# Patient Record
Sex: FEMALE | Race: ASIAN | NOT HISPANIC OR LATINO | URBAN - METROPOLITAN AREA
[De-identification: names, ages, dates, MRNs, and addresses within clinical notes are randomized per-mention and may not be internally consistent; named-entity substitution may affect disease eponyms.]

---

## 2022-10-03 ENCOUNTER — EMERGENCY (EMERGENCY)
Facility: HOSPITAL | Age: 47
LOS: 1 days | Discharge: HOME | End: 2022-10-03
Attending: STUDENT IN AN ORGANIZED HEALTH CARE EDUCATION/TRAINING PROGRAM | Admitting: STUDENT IN AN ORGANIZED HEALTH CARE EDUCATION/TRAINING PROGRAM
Payer: MEDICAID

## 2022-10-03 VITALS
TEMPERATURE: 98 F | SYSTOLIC BLOOD PRESSURE: 94 MMHG | HEART RATE: 84 BPM | RESPIRATION RATE: 16 BRPM | OXYGEN SATURATION: 98 % | DIASTOLIC BLOOD PRESSURE: 66 MMHG | WEIGHT: 110.01 LBS

## 2022-10-03 LAB — HCG UR QL: NEGATIVE — SIGNIFICANT CHANGE UP

## 2022-10-03 PROCEDURE — 99283 EMERGENCY DEPT VISIT LOW MDM: CPT

## 2022-10-03 PROCEDURE — 81025 URINE PREGNANCY TEST: CPT

## 2022-10-03 NOTE — ED PROVIDER NOTE - CLINICAL SUMMARY MEDICAL DECISION MAKING FREE TEXT BOX
47yo F ho labial cysts,  pw pain and swelling to right labia for a couple days, no fevers, no diff urinating, on exam small nodule palpated in irght labia, nondraining, indurated not fluctuant, recs for abx, sitz bath, close gyn follow up

## 2022-10-03 NOTE — ED PROVIDER NOTE - CARE PROVIDER_API CALL
Pj Chua)  Obstetrics and Gynecology  [address] [place] [address]  Phone: [phone]  Fax: [phone]  Follow Up Time:

## 2022-10-03 NOTE — ED ADULT NURSE NOTE - CHIEF COMPLAINT
Rounded on pt. Pt resting in bed watching TV. Unlabored breathing. No signs of distress at this time. Call light within reach.    The patient is a 46y Female complaining of abscess.

## 2022-10-03 NOTE — ED PROVIDER NOTE - NS ED ATTENDING STATEMENT MOD
This was a shared visit with the SELENA. I reviewed and verified the documentation and independently performed the documented:

## 2022-10-03 NOTE — ED PROVIDER NOTE - NSFOLLOWUPINSTRUCTIONS_ED_ALL_ED_FT
Tylenol, ibuprofen for pain.  Sitz baths as discussed.  Bactrim twice a day (new prescription sent to pharmacy).  Call Dr. Chua's office today to be seen tomorrow.  Return for worsening or concerning symptoms.          A Bartholin's cyst is a fluid-filled sac that forms on a Bartholin's gland. Bartholin's glands are small glands in the folds of skin near the opening of the vagina (labia). This type of cyst causes a bulge or lump near the opening of the vagina.    If you have a cyst that is small and not infected, you may be able to take care of it at home. If your cyst gets infected, it may cause pain and your doctor may need to drain it.      What are the causes?    This condition may be caused by a blocked Bartholin's gland. Germs (bacteria) inside of the cyst can cause an infection.      What are the signs or symptoms?    •A bulge or lump near the opening of the vagina.      •Discomfort or pain.      •Redness, swelling, or fluid draining from the area.        How is this treated?    You may not need treatment if your cyst is not causing symptoms. The cyst can go away on its own with home care. Home care includes hot baths or heat therapy.    Large cysts or cysts that are infected may be treated with:  •Antibiotic medicine.      •A procedure to drain the fluid.      Cysts that keep coming back will need to be drained many times. Your doctor may talk to you about surgery to remove the cyst.      Follow these instructions at home:    Medicines     •Take over-the-counter and prescription medicines only as told by your doctor.      •If you were prescribed an antibiotic medicine, take it as told by your doctor. Do not stop taking it even if you start to feel better.      Managing pain and swelling     •Try sitz baths to help with pain and swelling. A sitz bath is a warm water bath in which the water only comes up to your hips and should cover your buttocks. You may take sitz baths a few times a day.    •If told, put heat on the affected area as often as needed. Use the heat source that your doctor recommends, such as a moist heat pack or a heating pad.  •Place a towel between your skin and the heat source.      •Leave the heat on for 20–30 minutes.      •Take off the heat if your skin turns bright red. This is very important. If you cannot feel pain, heat, or cold, you have a greater risk of getting burned.        General instructions   •If your cyst was drained:  •Follow instructions from your doctor about how to take care of your wound.       •Use feminine pads to absorb any fluid.        • Do not push on or squeeze your cyst.      • Do not have sex until the cyst has gone away or your wound from drainage has healed.    •Take these steps to help prevent a cyst from returning, and to prevent other cysts from forming:  •Take a bath or shower once a day. Clean the area around your vagina with mild soap and water when you bathe.      •Practice safe sex to prevent STIs. Talk with your doctor about how to prevent STIs and which forms of birth control to use.        •Keep all follow-up visits.        Contact a doctor if:    •You have a fever.      •You get more redness, swelling, or pain around your cyst.      •You have fluid, blood, pus, or a bad smell coming from your cyst.      •You have a cyst that gets larger or a cyst that comes back.        Summary    •A Bartholin's cyst is a fluid-filled sac that forms on a Bartholin's gland. These small glands are found in the folds of skin near the opening of the vagina (labia).      •This type of cyst causes a bulge or lump near the opening of the vagina.      •Try sitz baths a few times a day to help with pain and swelling.      • Do not push on or squeeze your cyst.      This information is not intended to replace advice given to you by your health care provider. Make sure you discuss any questions you have with your health care provider.

## 2022-10-03 NOTE — ED PROVIDER NOTE - PATIENT PORTAL LINK FT
You can access the FollowMyHealth Patient Portal offered by [facility] by registering at the following website: [email or web address]. By joining [employer]’s FollowMyHealth portal, you will also be able to view your health information using other applications (apps) compatible with our system.

## 2023-10-18 ENCOUNTER — EMERGENCY (EMERGENCY)
Facility: HOSPITAL | Age: 48
LOS: 1 days | Discharge: HOME | End: 2023-10-18
Attending: EMERGENCY MEDICINE | Admitting: EMERGENCY MEDICINE
Payer: MEDICAID

## 2023-10-18 VITALS
HEART RATE: 76 BPM | TEMPERATURE: 97 F | RESPIRATION RATE: 14 BRPM | OXYGEN SATURATION: 100 % | HEIGHT: 66 IN | SYSTOLIC BLOOD PRESSURE: 122 MMHG | WEIGHT: 115.08 LBS | DIASTOLIC BLOOD PRESSURE: 83 MMHG

## 2023-10-18 VITALS
OXYGEN SATURATION: 99 % | RESPIRATION RATE: 15 BRPM | DIASTOLIC BLOOD PRESSURE: 80 MMHG | SYSTOLIC BLOOD PRESSURE: 113 MMHG | HEART RATE: 72 BPM

## 2023-10-18 LAB
ALBUMIN SERPL ELPH-MCNC: 4.2 G/DL — SIGNIFICANT CHANGE UP (ref 3.3–5)
ALBUMIN SERPL ELPH-MCNC: 4.2 G/DL — SIGNIFICANT CHANGE UP (ref 3.3–5)
ALP SERPL-CCNC: 44 U/L — SIGNIFICANT CHANGE UP (ref 30–120)
ALP SERPL-CCNC: 44 U/L — SIGNIFICANT CHANGE UP (ref 30–120)
ALT FLD-CCNC: 17 U/L — SIGNIFICANT CHANGE UP (ref 10–60)
ALT FLD-CCNC: 17 U/L — SIGNIFICANT CHANGE UP (ref 10–60)
ANION GAP SERPL CALC-SCNC: 11 MMOL/L — SIGNIFICANT CHANGE UP (ref 5–17)
ANION GAP SERPL CALC-SCNC: 11 MMOL/L — SIGNIFICANT CHANGE UP (ref 5–17)
AST SERPL-CCNC: 23 U/L — SIGNIFICANT CHANGE UP (ref 10–40)
AST SERPL-CCNC: 23 U/L — SIGNIFICANT CHANGE UP (ref 10–40)
BASOPHILS # BLD AUTO: 0.02 K/UL — SIGNIFICANT CHANGE UP (ref 0–0.2)
BASOPHILS # BLD AUTO: 0.02 K/UL — SIGNIFICANT CHANGE UP (ref 0–0.2)
BASOPHILS NFR BLD AUTO: 0.3 % — SIGNIFICANT CHANGE UP (ref 0–2)
BASOPHILS NFR BLD AUTO: 0.3 % — SIGNIFICANT CHANGE UP (ref 0–2)
BILIRUB SERPL-MCNC: 0.5 MG/DL — SIGNIFICANT CHANGE UP (ref 0.2–1.2)
BILIRUB SERPL-MCNC: 0.5 MG/DL — SIGNIFICANT CHANGE UP (ref 0.2–1.2)
BUN SERPL-MCNC: 19 MG/DL — SIGNIFICANT CHANGE UP (ref 7–23)
BUN SERPL-MCNC: 19 MG/DL — SIGNIFICANT CHANGE UP (ref 7–23)
CALCIUM SERPL-MCNC: 9.5 MG/DL — SIGNIFICANT CHANGE UP (ref 8.4–10.5)
CALCIUM SERPL-MCNC: 9.5 MG/DL — SIGNIFICANT CHANGE UP (ref 8.4–10.5)
CHLORIDE SERPL-SCNC: 104 MMOL/L — SIGNIFICANT CHANGE UP (ref 96–108)
CHLORIDE SERPL-SCNC: 104 MMOL/L — SIGNIFICANT CHANGE UP (ref 96–108)
CO2 SERPL-SCNC: 26 MMOL/L — SIGNIFICANT CHANGE UP (ref 22–31)
CO2 SERPL-SCNC: 26 MMOL/L — SIGNIFICANT CHANGE UP (ref 22–31)
CREAT SERPL-MCNC: 0.91 MG/DL — SIGNIFICANT CHANGE UP (ref 0.5–1.3)
CREAT SERPL-MCNC: 0.91 MG/DL — SIGNIFICANT CHANGE UP (ref 0.5–1.3)
EGFR: 77 ML/MIN/1.73M2 — SIGNIFICANT CHANGE UP
EGFR: 77 ML/MIN/1.73M2 — SIGNIFICANT CHANGE UP
EOSINOPHIL # BLD AUTO: 0.02 K/UL — SIGNIFICANT CHANGE UP (ref 0–0.5)
EOSINOPHIL # BLD AUTO: 0.02 K/UL — SIGNIFICANT CHANGE UP (ref 0–0.5)
EOSINOPHIL NFR BLD AUTO: 0.3 % — SIGNIFICANT CHANGE UP (ref 0–6)
EOSINOPHIL NFR BLD AUTO: 0.3 % — SIGNIFICANT CHANGE UP (ref 0–6)
GLUCOSE SERPL-MCNC: 102 MG/DL — HIGH (ref 70–99)
GLUCOSE SERPL-MCNC: 102 MG/DL — HIGH (ref 70–99)
HCG UR QL: NEGATIVE — SIGNIFICANT CHANGE UP
HCG UR QL: NEGATIVE — SIGNIFICANT CHANGE UP
HCT VFR BLD CALC: 41.8 % — SIGNIFICANT CHANGE UP (ref 34.5–45)
HCT VFR BLD CALC: 41.8 % — SIGNIFICANT CHANGE UP (ref 34.5–45)
HGB BLD-MCNC: 13.1 G/DL — SIGNIFICANT CHANGE UP (ref 11.5–15.5)
HGB BLD-MCNC: 13.1 G/DL — SIGNIFICANT CHANGE UP (ref 11.5–15.5)
IMM GRANULOCYTES NFR BLD AUTO: 0.2 % — SIGNIFICANT CHANGE UP (ref 0–0.9)
IMM GRANULOCYTES NFR BLD AUTO: 0.2 % — SIGNIFICANT CHANGE UP (ref 0–0.9)
LIDOCAIN IGE QN: 30 U/L — SIGNIFICANT CHANGE UP (ref 16–77)
LIDOCAIN IGE QN: 30 U/L — SIGNIFICANT CHANGE UP (ref 16–77)
LYMPHOCYTES # BLD AUTO: 1.72 K/UL — SIGNIFICANT CHANGE UP (ref 1–3.3)
LYMPHOCYTES # BLD AUTO: 1.72 K/UL — SIGNIFICANT CHANGE UP (ref 1–3.3)
LYMPHOCYTES # BLD AUTO: 28.7 % — SIGNIFICANT CHANGE UP (ref 13–44)
LYMPHOCYTES # BLD AUTO: 28.7 % — SIGNIFICANT CHANGE UP (ref 13–44)
MCHC RBC-ENTMCNC: 29.2 PG — SIGNIFICANT CHANGE UP (ref 27–34)
MCHC RBC-ENTMCNC: 29.2 PG — SIGNIFICANT CHANGE UP (ref 27–34)
MCHC RBC-ENTMCNC: 31.3 GM/DL — LOW (ref 32–36)
MCHC RBC-ENTMCNC: 31.3 GM/DL — LOW (ref 32–36)
MCV RBC AUTO: 93.1 FL — SIGNIFICANT CHANGE UP (ref 80–100)
MCV RBC AUTO: 93.1 FL — SIGNIFICANT CHANGE UP (ref 80–100)
MONOCYTES # BLD AUTO: 0.32 K/UL — SIGNIFICANT CHANGE UP (ref 0–0.9)
MONOCYTES # BLD AUTO: 0.32 K/UL — SIGNIFICANT CHANGE UP (ref 0–0.9)
MONOCYTES NFR BLD AUTO: 5.3 % — SIGNIFICANT CHANGE UP (ref 2–14)
MONOCYTES NFR BLD AUTO: 5.3 % — SIGNIFICANT CHANGE UP (ref 2–14)
NEUTROPHILS # BLD AUTO: 3.91 K/UL — SIGNIFICANT CHANGE UP (ref 1.8–7.4)
NEUTROPHILS # BLD AUTO: 3.91 K/UL — SIGNIFICANT CHANGE UP (ref 1.8–7.4)
NEUTROPHILS NFR BLD AUTO: 65.2 % — SIGNIFICANT CHANGE UP (ref 43–77)
NEUTROPHILS NFR BLD AUTO: 65.2 % — SIGNIFICANT CHANGE UP (ref 43–77)
NRBC # BLD: 0 /100 WBCS — SIGNIFICANT CHANGE UP (ref 0–0)
NRBC # BLD: 0 /100 WBCS — SIGNIFICANT CHANGE UP (ref 0–0)
PLATELET # BLD AUTO: 247 K/UL — SIGNIFICANT CHANGE UP (ref 150–400)
PLATELET # BLD AUTO: 247 K/UL — SIGNIFICANT CHANGE UP (ref 150–400)
POTASSIUM SERPL-MCNC: 4 MMOL/L — SIGNIFICANT CHANGE UP (ref 3.5–5.3)
POTASSIUM SERPL-MCNC: 4 MMOL/L — SIGNIFICANT CHANGE UP (ref 3.5–5.3)
POTASSIUM SERPL-SCNC: 4 MMOL/L — SIGNIFICANT CHANGE UP (ref 3.5–5.3)
POTASSIUM SERPL-SCNC: 4 MMOL/L — SIGNIFICANT CHANGE UP (ref 3.5–5.3)
PROT SERPL-MCNC: 7.3 G/DL — SIGNIFICANT CHANGE UP (ref 6–8.3)
PROT SERPL-MCNC: 7.3 G/DL — SIGNIFICANT CHANGE UP (ref 6–8.3)
RBC # BLD: 4.49 M/UL — SIGNIFICANT CHANGE UP (ref 3.8–5.2)
RBC # BLD: 4.49 M/UL — SIGNIFICANT CHANGE UP (ref 3.8–5.2)
RBC # FLD: 13.2 % — SIGNIFICANT CHANGE UP (ref 10.3–14.5)
RBC # FLD: 13.2 % — SIGNIFICANT CHANGE UP (ref 10.3–14.5)
SODIUM SERPL-SCNC: 141 MMOL/L — SIGNIFICANT CHANGE UP (ref 135–145)
SODIUM SERPL-SCNC: 141 MMOL/L — SIGNIFICANT CHANGE UP (ref 135–145)
WBC # BLD: 6 K/UL — SIGNIFICANT CHANGE UP (ref 3.8–10.5)
WBC # BLD: 6 K/UL — SIGNIFICANT CHANGE UP (ref 3.8–10.5)
WBC # FLD AUTO: 6 K/UL — SIGNIFICANT CHANGE UP (ref 3.8–10.5)
WBC # FLD AUTO: 6 K/UL — SIGNIFICANT CHANGE UP (ref 3.8–10.5)

## 2023-10-18 PROCEDURE — 80053 COMPREHEN METABOLIC PANEL: CPT

## 2023-10-18 PROCEDURE — 81025 URINE PREGNANCY TEST: CPT

## 2023-10-18 PROCEDURE — 36415 COLL VENOUS BLD VENIPUNCTURE: CPT

## 2023-10-18 PROCEDURE — 99284 EMERGENCY DEPT VISIT MOD MDM: CPT

## 2023-10-18 PROCEDURE — 96375 TX/PRO/DX INJ NEW DRUG ADDON: CPT

## 2023-10-18 PROCEDURE — 83690 ASSAY OF LIPASE: CPT

## 2023-10-18 PROCEDURE — 96368 THER/DIAG CONCURRENT INF: CPT

## 2023-10-18 PROCEDURE — 85025 COMPLETE CBC W/AUTO DIFF WBC: CPT

## 2023-10-18 PROCEDURE — 96365 THER/PROPH/DIAG IV INF INIT: CPT

## 2023-10-18 PROCEDURE — 99284 EMERGENCY DEPT VISIT MOD MDM: CPT | Mod: 25

## 2023-10-18 RX ORDER — METOCLOPRAMIDE HCL 10 MG
10 TABLET ORAL ONCE
Refills: 0 | Status: COMPLETED | OUTPATIENT
Start: 2023-10-18 | End: 2023-10-18

## 2023-10-18 RX ORDER — SODIUM CHLORIDE 9 MG/ML
1000 INJECTION INTRAMUSCULAR; INTRAVENOUS; SUBCUTANEOUS ONCE
Refills: 0 | Status: COMPLETED | OUTPATIENT
Start: 2023-10-18 | End: 2023-10-18

## 2023-10-18 RX ORDER — DIPHENHYDRAMINE HCL 50 MG
50 CAPSULE ORAL ONCE
Refills: 0 | Status: COMPLETED | OUTPATIENT
Start: 2023-10-18 | End: 2023-10-18

## 2023-10-18 RX ORDER — ACETAMINOPHEN 500 MG
1000 TABLET ORAL ONCE
Refills: 0 | Status: COMPLETED | OUTPATIENT
Start: 2023-10-18 | End: 2023-10-18

## 2023-10-18 RX ADMIN — SODIUM CHLORIDE 1000 MILLILITER(S): 9 INJECTION INTRAMUSCULAR; INTRAVENOUS; SUBCUTANEOUS at 15:00

## 2023-10-18 RX ADMIN — Medication 104 MILLIGRAM(S): at 13:56

## 2023-10-18 RX ADMIN — Medication 400 MILLIGRAM(S): at 13:56

## 2023-10-18 RX ADMIN — Medication 10 MILLIGRAM(S): at 14:30

## 2023-10-18 RX ADMIN — Medication 1000 MILLIGRAM(S): at 14:30

## 2023-10-18 RX ADMIN — SODIUM CHLORIDE 1000 MILLILITER(S): 9 INJECTION INTRAMUSCULAR; INTRAVENOUS; SUBCUTANEOUS at 14:02

## 2023-10-18 RX ADMIN — Medication 50 MILLIGRAM(S): at 13:56

## 2023-10-18 NOTE — ED PROVIDER NOTE - IV ALTEPLASE EXCL REL HIDDEN
Called pt to follow up on axillary abscess. Left brief msg to return call. LPN to instruct patient to start flagyl (sent to pharmacy) if abscess persist and schedule follow up in 7-10 days. Otherwise, keep routine postpartum visit. show

## 2023-10-18 NOTE — ED PROVIDER NOTE - OBJECTIVE STATEMENT
Patient is a 48-year-old female presents to the emergency room with a chief complaint of a headache.  Also very anxious.  Past medical history of anxiety.  Reports that she was having difficulty sleeping despite her medication so she decided that she would try 4 drops over-the-counter CBD melatonin mixture.  Shortly after applying the mixture she developed a headache felt very anxious and jittery with nausea.  When asked about shortness of breath patient reports maybe she had mild shortness of breath.  Also notes epigastric discomfort.  She did difficult time sleeping and symptoms persisted throughout the day.  Denies any trauma to the head.  Denies any fevers chills difficulty seeing vomiting chest pain or abdominal pain.  Patient had never tried the over-the-counter CBD oil prior. Patient is a 48-year-old female presents to the emergency room with a chief complaint of a headache.  Also very anxious.  Past medical history of anxiety h/o migraines follows with neurology receives Botox had prior MRI.  Reports that she was having difficulty sleeping despite her medication so she decided that she would try 4 drops over-the-counter CBD melatonin mixture.  Shortly after applying the mixture she developed a headache felt very anxious and jittery with nausea.  When asked about shortness of breath patient reports maybe she had mild shortness of breath.  Also notes epigastric discomfort.  She did difficult time sleeping and symptoms persisted throughout the day.  Denies any trauma to the head.  Denies any fevers chills difficulty seeing vomiting chest pain or abdominal pain.  Patient had never tried the over-the-counter CBD oil prior.

## 2023-10-18 NOTE — ED PROVIDER NOTE - PATIENT PORTAL LINK FT
You can access the FollowMyHealth Patient Portal offered by [facility] by registering at the following website: [email or web address]. By joining [employer]’s FollowMyHealth portal, you will also be able to view your health information using other applications (apps) compatible with our system.

## 2023-10-18 NOTE — ED PROVIDER NOTE - NSFOLLOWUPINSTRUCTIONS_ED_ALL_ED_FT
Return to the ED for any new or worsening symptoms  Take your medication as prescribed  Advance activity as tolerated  Make sure to remain hydrated  Stop the over-the-counter CBD oil  Follow-up with your PMD in 1 to 2 days for recheck      Acute Headache    WHAT YOU NEED TO KNOW:    What is an acute headache? An acute headache is pain or discomfort that may start suddenly and get worse quickly. You may have an acute headache only when you feel stress or eat certain foods. Other acute headache pain can happen every day, and sometimes several times a day.    What are the most common types of acute headache?    Tension headache is the most common type of headache. These headaches typically occur in the late afternoon and go away by evening. The pain is usually mild or moderate. You may have problems tolerating bright light or loud noise. The pain is usually across the forehead or in the back of the head, often only on one side. These headaches may occur every day.    Migraine headaches cause moderate or severe pain. The headache generally lasts from 1 to 3 days and tends to come back. Pain is usually on only one side, but it may change sides. Migraines often occur in the temple, the back of the head, or behind the eye. The pain may throb or be sharp and steady.    A migraine with aura means you see or feel something before a migraine. You may see a small spot surrounded by bright zigzag lines. Other signs or symptoms may follow the aura.    Cluster headache pain is usually only on one side. It often causes severe pain, and can last for 30 minutes to 2 hours. These headaches may occur 1 or 2 times each day, more often at night. The pain may wake you.  What causes acute headaches? The cause of your headache may not be known. The following can trigger a headache:    Stress or tension, hours or even days after stressful events    Fatigue, a lack of sleep or changes in your usual sleep pattern, or a nap during the day    Menstruation, especially after pregnancy, or use of birth control pills or hormone replacement therapy    Food such as cured meats, artificial sweeteners, alcohol, dark chocolate, and MSG    Suddenly not having caffeine if you usually have larger amounts    A medical problem, such as an infection, tooth pain, neck or sinus pain, thyroid problems, or a tumor    A head injury  How is the type of acute headache diagnosed and treated? Your healthcare provider will ask you to describe your pain and rate it on a scale from 1 to 10. Tell the provider how often you have headaches and how long they last. Also describe any other symptoms you have along with headaches, such as dizziness or blurred vision. You may need tests including a CT scan to make sure there is not a leak in any blood vessels.    Medicines may be given to manage or prevent headaches. The medicine will depend on the type of acute headache you have. Do not wait until the pain is severe before you take your medicine. You may be able to take over-the-counter pain medicines as needed. Examples include NSAIDs and acetaminophen. Ask your healthcare provider which medicine is right for you. Ask how much to take and when to take it. Follow directions. These medicines can cause stomach bleeding or kidney or liver damage if not taken correctly.    Biofeedback may be used to help you manage stress. Electrodes (wires) are placed on your body and attached to a monitor. You will learn how to change stress reactions. For example, you learn to slow your heart rate when you become upset.    Cognitive behavior therapy, or stress management, may be used with other therapies to prevent headaches.  What can I do to manage my symptoms?    Apply heat or ice on the headache area. Use a heat or ice pack. For an ice pack, you can also put crushed ice in a plastic bag. Cover the pack or bag with a towel before you apply it to your skin. Ice and heat both help decrease pain, and heat also helps decrease muscle spasms. Apply heat for 20 to 30 minutes every 2 hours. Apply ice for 15 to 20 minutes every hour. Apply heat or ice for as long and for as many days as directed. You may alternate heat and ice.    Relax your muscles. Lie down in a comfortable position and close your eyes. Relax your muscles slowly. Start at your toes and work your way up your body.    Keep a record of your headaches. Write down when your headaches start and stop. Include your symptoms and what you were doing when the headache began. Record what you ate or drank for 24 hours before the headache started. Describe the pain and where it hurts. Keep track of what you did to treat your headache and if it worked.  What can I do to prevent an acute headache?    Avoid anything that triggers an acute headache. Examples include exposure to chemicals, going to high altitude, or not getting enough sleep. Create a regular sleep routine. Go to sleep at the same time and wake up at the same time each day. Do not use electronic devices before bedtime. These may trigger a headache or prevent you from sleeping well.    Do not smoke. Nicotine and other chemicals in cigarettes and cigars can trigger an acute headache or make it worse. Ask your healthcare provider for information if you currently smoke and need help to quit. E-cigarettes or smokeless tobacco still contain nicotine. Talk to your healthcare provider before you use these products.    Limit alcohol as directed. Alcohol can trigger an acute headache or make it worse. If you have cluster headaches, do not drink alcohol during an episode. For other types of headaches, ask your healthcare provider if it is safe for you to drink alcohol. Ask how much is safe for you to drink, and how often.    Exercise as directed. Exercise can reduce tension and help with headache pain. Aim for 30 minutes of physical activity on most days of the week. Your healthcare provider can help you create an exercise plan.    Eat a variety of healthy foods. Healthy foods include fruits, vegetables, low-fat dairy products, lean meats, fish, whole grains, and cooked beans. Your healthcare provider or dietitian can help you create meals plans if you need to avoid foods that trigger headaches.  When should I seek immediate care?    You have severe pain.    You have numbness or weakness on one side of your face or body.    You have a headache that occurs after a blow to the head, a fall, or other trauma.    You have a headache, are forgetful or confused, or have trouble speaking.    You have a headache, stiff neck, and a fever.  When should I call my doctor?    You have a constant headache and are vomiting.    You have a headache each day that does not get better, even after treatment.    You have changes in your headaches, or new symptoms that occur when you have a headache.    You have questions or concerns about your condition or care.  CARE AGREEMENT:    You have the right to help plan your care. Learn about your health condition and how it may be treated. Discuss treatment options with your healthcare providers to decide what care you want to receive. You always have the right to refuse treatment.

## 2023-10-18 NOTE — ED ADULT TRIAGE NOTE - BEFAST ARM SIDE DRIFT
No No caffine, no carbonated beverages, no chocolate/consistent carbohydrate (evening snack)/1000ml/regular/renal replacement pts:no protein restr,no conc K & phos, low sodium

## 2023-10-18 NOTE — ED ADULT NURSE NOTE - NSFALLUNIVINTERV_ED_ALL_ED
Bed/Stretcher in lowest position, wheels locked, appropriate side rails in place/Call bell, personal items and telephone in reach/Instruct patient to call for assistance before getting out of bed/chair/stretcher/Non-slip footwear applied when patient is off stretcher/[place] to call system/Physically safe environment - no spills, clutter or unnecessary equipment/Purposeful proactive rounding/Room/bathroom lighting operational, light cord in reach

## 2023-10-18 NOTE — ED PROVIDER NOTE - CLINICAL SUMMARY MEDICAL DECISION MAKING FREE TEXT BOX
Patient is a 48-year-old female presents to the emergency room with a chief complaint of a headache.  Also very anxious.  Past medical history of anxiety.  Reports that she was having difficulty sleeping despite her medication so she decided that she would try 4 drops over-the-counter CBD melatonin mixture.  Shortly after applying the mixture she developed a headache felt very anxious and jittery with nausea.  When asked about shortness of breath patient reports maybe she had mild shortness of breath.  Also notes epigastric discomfort.  She did difficult time sleeping and symptoms persisted throughout the day.  Denies any trauma to the head.  Denies any fevers chills difficulty seeing vomiting chest pain or abdominal pain.  Patient had never tried the over-the-counter CBD oil prior. Patient is a 48-year-old female presents to the emergency room with a chief complaint of a headache.  Also very anxious.  Past medical history of anxiety.  Reports that she was having difficulty sleeping despite her medication so she decided that she would try 4 drops over-the-counter CBD melatonin mixture.  Shortly after applying the mixture she developed a headache felt very anxious and jittery with nausea.  When asked about shortness of breath patient reports maybe she had mild shortness of breath.  Also notes epigastric discomfort.  She did difficult time sleeping and symptoms persisted throughout the day.  Denies any trauma to the head.  Denies any fevers chills difficulty seeing vomiting chest pain or abdominal pain.  Patient had never tried the over-the-counter CBD oil prior.  Patient presenting to the emergency room with headache and anxiety after taking over-the-counter CBD oil.  Differential is broad includes possible dehydration versus anxiety versus medication reaction.  Will obtain screening labs check pregnancy hydrate medicate for symptoms and monitor. Patient resting comfortably with resolution of headache stable for discharge home.  Patient with a normal white count no evidence of anemia no electrolyte abnormality urine pregnancy negative. Patient is a 48-year-old female presents to the emergency room with a chief complaint of a headache.  Also very anxious.  Past medical history of anxiety h/o migraines follows with neurology receives Botox had prior MRI.  Reports that she was having difficulty sleeping despite her medication so she decided that she would try 4 drops over-the-counter CBD melatonin mixture.  Shortly after applying the mixture she developed a headache felt very anxious and jittery with nausea.  When asked about shortness of breath patient reports maybe she had mild shortness of breath.  Also notes epigastric discomfort.  She did difficult time sleeping and symptoms persisted throughout the day.  Denies any trauma to the head.  Denies any fevers chills difficulty seeing vomiting chest pain or abdominal pain.  Patient had never tried the over-the-counter CBD oil prior.  Patient presenting to the emergency room with headache and anxiety after taking over-the-counter CBD oil.  Differential is broad includes possible dehydration versus anxiety versus medication reaction.  Will obtain screening labs check pregnancy hydrate medicate for symptoms and monitor. Patient resting comfortably with resolution of headache stable for discharge home.  Patient with a normal white count no evidence of anemia no electrolyte abnormality urine pregnancy negative.

## 2023-10-18 NOTE — ED PROVIDER NOTE - DIFFERENTIAL DIAGNOSIS
Patient presenting to the emergency room with headache and anxiety after taking over-the-counter CBD oil.  Differential is broad includes possible dehydration versus anxiety versus medication reaction.  Will obtain screening labs check pregnancy hydrate medicate for symptoms and monitor. Differential Diagnosis

## 2024-03-10 ENCOUNTER — EMERGENCY (EMERGENCY)
Facility: HOSPITAL | Age: 49
LOS: 1 days | Discharge: HOME | End: 2024-03-10
Attending: EMERGENCY MEDICINE | Admitting: EMERGENCY MEDICINE
Payer: MEDICAID

## 2024-03-10 VITALS
SYSTOLIC BLOOD PRESSURE: 101 MMHG | DIASTOLIC BLOOD PRESSURE: 66 MMHG | OXYGEN SATURATION: 97 % | RESPIRATION RATE: 15 BRPM | HEART RATE: 80 BPM

## 2024-03-10 VITALS
OXYGEN SATURATION: 97 % | TEMPERATURE: 98 F | HEART RATE: 81 BPM | RESPIRATION RATE: 15 BRPM | SYSTOLIC BLOOD PRESSURE: 100 MMHG | DIASTOLIC BLOOD PRESSURE: 64 MMHG | WEIGHT: 110.23 LBS | HEIGHT: 66 IN

## 2024-03-10 DIAGNOSIS — N75.0 CYST OF BARTHOLIN'S GLAND: ICD-10-CM

## 2024-03-10 LAB
GRAM STN FLD: ABNORMAL
SPECIMEN SOURCE: SIGNIFICANT CHANGE UP

## 2024-03-10 PROCEDURE — 99284 EMERGENCY DEPT VISIT MOD MDM: CPT

## 2024-03-10 PROCEDURE — 87077 CULTURE AEROBIC IDENTIFY: CPT

## 2024-03-10 PROCEDURE — 87186 SC STD MICRODIL/AGAR DIL: CPT

## 2024-03-10 PROCEDURE — 87070 CULTURE OTHR SPECIMN AEROBIC: CPT

## 2024-03-10 PROCEDURE — 87205 SMEAR GRAM STAIN: CPT

## 2024-03-10 PROCEDURE — 56420 I&D BARTHOLINS GLAND ABSCESS: CPT

## 2024-03-10 NOTE — ED ADULT NURSE NOTE - OBJECTIVE STATEMENT
patient is from home, aaox3, c/o pain in her vaginal area from cyst, I&D done about week ago and took antibiotic, still has pain and now has a cyst different place, denies fever or chills, no n/v or urinary symptoms, pending GYN consult, plan of care ongoing

## 2024-03-10 NOTE — ED PROVIDER NOTE - NSFOLLOWUPINSTRUCTIONS_ED_ALL_ED_FT
Bartholin's Cyst Incision and Drainage, Care After  After Bartholin's cyst incision and drainage, it is common to have light vaginal discharge. It may contain blood. It is also common to have:  Mild pain.  Discomfort.  Follow these instructions at home:  The instructions below may help you care for yourself at home. Your health care provider may give you more instructions. If you have questions, ask your health care provider.    Medicines    Take over-the-counter and prescription medicines only as told by your health care provider.  If you were prescribed antibiotics, take them as told by your health care provider. Do not stop taking them even if you start to feel better.  Bathing    A bathtub partially filled with water.  Take sitz baths as told by your health care provider. You may be told to start these 1–2 days after your procedure. Take them once or twice each day.  Incision care    Follow instructions from your health care provider about:  Signs of infection.  How to take care of your incision. Make sure you:  Wash your hands with soap and water for at least 20 seconds before and after you change your bandage. If you cannot use soap and water, use hand .  Change your bandage.  Leave stitches or skin glue in place for at least 2 weeks.  Leave tape strips alone unless you are told to take them off. You may trim the edges of the tape strips if they curl up.  General instructions    Return to your normal activities when your health care provider says that it is safe.  Do not have vaginal sex or insert anything in your vagina until your health care provider says it is safe.  Wear an absorbent pad if you have any discharge.  Keep all follow-up visits. This is important. If you have a catheter, return to your health care provider to have it removed.  Contact a health care provider if:  You have chills or a fever.  You have pain even after taking medicine.  Your incision has signs of infection.  Your catheter comes out.  Summary  After your procedure, it is common to have mild pain and light discharge.  Start taking sitz baths as told by your health care provider.  Do not have vaginal sex or insert anything in your vagina until your health care provider says it is safe.  Contact your health care provider if your incision has signs of infection.  If you have a catheter, return to your health care provider to have it removed. Follow-up with your GYN on Wednesday, 3/13 for wound check, reevaluation, ongoing care and treatment.  Keep wound clean/dry/intact.  Take full course of antibiotics as prescribed.  Take over-the-counter Motrin with food as directed for pain.  If having worsening symptoms, fever, streaking or other related symptoms, return to the ER immediately.    Bartholin's Cyst Incision and Drainage, Care After  After Bartholin's cyst incision and drainage, it is common to have light vaginal discharge. It may contain blood. It is also common to have:  Mild pain.  Discomfort.  Follow these instructions at home:  The instructions below may help you care for yourself at home. Your health care provider may give you more instructions. If you have questions, ask your health care provider.    Medicines    Take over-the-counter and prescription medicines only as told by your health care provider.  If you were prescribed antibiotics, take them as told by your health care provider. Do not stop taking them even if you start to feel better.  Bathing    A bathtub partially filled with water.  Take sitz baths as told by your health care provider. You may be told to start these 1–2 days after your procedure. Take them once or twice each day.  Incision care    Follow instructions from your health care provider about:  Signs of infection.  How to take care of your incision. Make sure you:  Wash your hands with soap and water for at least 20 seconds before and after you change your bandage. If you cannot use soap and water, use hand .  Change your bandage.  Leave stitches or skin glue in place for at least 2 weeks.  Leave tape strips alone unless you are told to take them off. You may trim the edges of the tape strips if they curl up.  General instructions    Return to your normal activities when your health care provider says that it is safe.  Do not have vaginal sex or insert anything in your vagina until your health care provider says it is safe.  Wear an absorbent pad if you have any discharge.  Keep all follow-up visits. This is important. If you have a catheter, return to your health care provider to have it removed.  Contact a health care provider if:  You have chills or a fever.  You have pain even after taking medicine.  Your incision has signs of infection.  Your catheter comes out.  Summary  After your procedure, it is common to have mild pain and light discharge.  Start taking sitz baths as told by your health care provider.  Do not have vaginal sex or insert anything in your vagina until your health care provider says it is safe.  Contact your health care provider if your incision has signs of infection.  If you have a catheter, return to your health care provider to have it removed.

## 2024-03-10 NOTE — CONSULT NOTE ADULT - SUBJECTIVE AND OBJECTIVE BOX
Patient is 47yo presenting for R bartholins cyst that appears 2 weeks ago. Reports that it was initially small however went to GYN last week when it was enlarged and cyst was needle aspirated. She was placed on amoxicillin for 3 days, however cyst has been growing rapidly since then. Has f/u on Wednesday but pain was so intense she came to ED today. Denies any fevers/chills, drainage. No other symptoms.  Patient has had recurrence of this cyst 3 times, always on right vulva.     PMH: insomnia, depression/anxiety, h/o breast cancer (in remission)    PSH: CSx1    Meds: zolpidem, lexapro, xanax    All: NKDA    SocHx: denies    FamHx: denies      O:  Vital Signs Last 24 Hrs  T(C): 36.8 (10 Mar 2024 14:06), Max: 36.8 (10 Mar 2024 14:06)  T(F): 98.3 (10 Mar 2024 14:06), Max: 98.3 (10 Mar 2024 14:06)  HR: 81 (10 Mar 2024 14:06) (81 - 81)  BP: 100/64 (10 Mar 2024 14:06) (100/64 - 100/64)  BP(mean): --  RR: 15 (10 Mar 2024 14:06) (15 - 15)  SpO2: 97% (10 Mar 2024 14:06) (97% - 97%)    Parameters below as of 10 Mar 2024 14:06  Patient On (Oxygen Delivery Method): room air    Gen: no acute  distress  CV/Resp: nonlabored  Abd: soft, nontender  : 4x3cm right bartholin's cyst, indurated and tender to touch, no drainage noted; no significant erythema or cellulitis noted  Psych: appropriate mood and affect  Ext: nontender, nondistended

## 2024-03-10 NOTE — ED PROVIDER NOTE - OBJECTIVE STATEMENT
48-year-old female with history of depression, anxiety presents with complaint of cyst to her right labia consistent with her prior Bartholin cyst.  Patient states that she noticed swelling to her right labia 2 weeks ago, saw her GYN in [place], Dr. Molina 1 week ago and had needle aspiration of the cyst, was prescribed amoxicillin for 3 days and advised to return on 3/20 if symptoms return.  States that swelling started getting worse last night with worsening pain and hence came to ER for evaluation.  Denies fever, chills, nausea, vomiting, drainage or other symptoms. 48-year-old female with history of depression, anxiety presents with complaint of cyst to her right labia consistent with her prior Bartholin cyst.  Patient states that she noticed swelling to her right labia 2 weeks ago, saw her GYN in [place], Dr. Molina 1 week ago and had needle aspiration of the cyst, was prescribed amoxicillin for 3 days and advised to return on 3/20 if symptoms return.  States that swelling started getting worse last night with worsening pain and difficulty ambulating and hence came to ER for evaluation. Patient relates her Bartholin cyst in the past have required multiple aspirations to resolve. Denies fever, chills, nausea, vomiting, drainage or other symptoms.

## 2024-03-10 NOTE — ED PROVIDER NOTE - ATTENDING APP SHARED VISIT CONTRIBUTION OF CARE
entire history and physical with female JU Peña present  Patient complaining of cyst to right labia consistent with prior Bartholin cyst.  Patient relates has had pain and swelling to her right labia for the past 2 weeks saw her gynecologist last week had a needle aspiration was given amoxicillin and scheduled for follow-up in 3 days however cyst has grown significantly and is causing pain and difficulty ambulating so came to ER.  Patient denies fevers chills drainage or any other complaints.  Patient relates her Bartholin cyst in the past have required multiple aspirations to resolve  GYN in [place]    Plan GYN consult for drainage    Differential including but not limited to Bartholin cyst abscess

## 2024-03-10 NOTE — ED PROVIDER NOTE - PROGRESS NOTE DETAILS
Spoke to GYN attending, Dr. Bob who will consult pt in ED shortly for I&D. Bartholin cyst drained in ED at bedside by Dr. Bob, cleared for dc home on bactrim x 5 days, has f/u with her GYN in 3 days.  Reevaluated patient at bedside.  Patient feeling much improved. Bartholin cyst drained in ED at bedside by GYN, Dr. Bob, cleared for dc home on bactrim x 5 days, has f/u with her GYN in 3 days.  Reevaluated patient at bedside.  Patient feeling much improved.

## 2024-03-10 NOTE — ED PROVIDER NOTE - PROVIDER TOKENS
PROVIDER:[TOKEN:[[id number]],FOLLOWUP:[1-3 Days]],FREE:[LAST:[YOUR GYN],PHONE:[(   )    -],FAX:[(   )    -],FOLLOWUP:[1-3 Days]]

## 2024-03-10 NOTE — CONSULT NOTE ADULT - ASSESSMENT
49yo presenting for painful R bartholin's cyst.    P  consent signed for R bartholins cyst incision and drainage and possible marsupialization  afebrile, vitals wnl    Procedure:  Patient placed in dorsal lithotomy position. Area of Hollywood line of inner R vulva cleaned with betadine. 3mL 1% lidocaine administered to area. 1cm incision made at Hollywood line and cyst opened with hemostats. Bartholin's cyst fluid drained in entirety, using hemostats to open any further pockets. Induration of R vulva was no longer noted. Culture swab obtained. Two interrupted stitches placed on L and R sides of incision with cyst wall as a partial marsupialization. Patient tolerated procedure well, pain resolved.    Bactrim DS x5 days sent to pharmacy  has f/u appt Wed with GYN, my information also given  counseled about marsupialization in OR vs bartholin's gland removal with Urogyn (referral given) if recurs  patient feeling much better after drainage, stable for discharge    Thank you for this consult please contact GYN on call for any further questions.
04-May-2018 15:33

## 2024-03-10 NOTE — ED PROVIDER NOTE - PATIENT PORTAL LINK FT
You can access the FollowMyHealth Patient Portal offered by [facility] by registering at the following website: [email or web address]. By joining [employer]’s FollowMyHealth portal, you will also be able to view your health information using other applications (apps) compatible with our system.

## 2024-03-10 NOTE — ED PROVIDER NOTE - CARE PROVIDER_API CALL
Urvashi Bob  Obstetrics and Gynecology  [address] [place] [id number]  Phone: [phone]  Fax: [phone]  Follow Up Time: 1-3 Days    YOUR GYN,   Phone: (   )    -  Fax: (   )    -  Follow Up Time: 1-3 Days

## 2024-03-10 NOTE — ED PROVIDER NOTE - CARE PROVIDERS DIRECT ADDRESSES
[email or web address]

## 2024-03-10 NOTE — ED PROVIDER NOTE - CLINICAL SUMMARY MEDICAL DECISION MAKING FREE TEXT BOX
entire history and physical with female JU Peña present  Patient complaining of cyst to right labia consistent with prior Bartholin cyst.  Patient relates has had pain and swelling to her right labia for the past 2 weeks saw her gynecologist last week had a needle aspiration was given amoxicillin and scheduled for follow-up in 3 days however cyst has grown significantly and is causing pain and difficulty ambulating so came to ER.  Patient denies fevers chills drainage or any other complaints.  Patient relates her Bartholin cyst in the past have required multiple aspirations to resolve.  GYN in [place]    Plan GYN consult for drainage    Differential including but not limited to Bartholin cyst abscess

## 2024-03-11 LAB — GRAM STN FLD: ABNORMAL

## 2024-03-12 LAB
-  AMOXICILLIN/CLAVULANIC ACID: SIGNIFICANT CHANGE UP
-  AMPICILLIN/SULBACTAM: SIGNIFICANT CHANGE UP
-  AMPICILLIN: SIGNIFICANT CHANGE UP
-  AZTREONAM: SIGNIFICANT CHANGE UP
-  CEFAZOLIN: SIGNIFICANT CHANGE UP
-  CEFEPIME: SIGNIFICANT CHANGE UP
-  CEFOXITIN: SIGNIFICANT CHANGE UP
-  CEFTRIAXONE: SIGNIFICANT CHANGE UP
-  CIPROFLOXACIN: SIGNIFICANT CHANGE UP
-  ERTAPENEM: SIGNIFICANT CHANGE UP
-  GENTAMICIN: SIGNIFICANT CHANGE UP
-  IMIPENEM: SIGNIFICANT CHANGE UP
-  LEVOFLOXACIN: SIGNIFICANT CHANGE UP
-  MEROPENEM: SIGNIFICANT CHANGE UP
-  PIPERACILLIN/TAZOBACTAM: SIGNIFICANT CHANGE UP
-  TOBRAMYCIN: SIGNIFICANT CHANGE UP
-  TRIMETHOPRIM/SULFAMETHOXAZOLE: SIGNIFICANT CHANGE UP
METHOD TYPE: SIGNIFICANT CHANGE UP

## 2024-03-15 LAB
CULTURE RESULTS: ABNORMAL
ORGANISM # SPEC MICROSCOPIC CNT: ABNORMAL
ORGANISM # SPEC MICROSCOPIC CNT: ABNORMAL
SPECIMEN SOURCE: SIGNIFICANT CHANGE UP

## 2024-03-20 ENCOUNTER — EMERGENCY (EMERGENCY)
Facility: HOSPITAL | Age: 49
LOS: 1 days | Discharge: HOME | End: 2024-03-20
Attending: EMERGENCY MEDICINE | Admitting: EMERGENCY MEDICINE
Payer: MEDICAID

## 2024-03-20 VITALS
SYSTOLIC BLOOD PRESSURE: 114 MMHG | DIASTOLIC BLOOD PRESSURE: 72 MMHG | RESPIRATION RATE: 19 BRPM | OXYGEN SATURATION: 100 % | TEMPERATURE: 98 F | HEART RATE: 66 BPM

## 2024-03-20 VITALS
WEIGHT: 115.08 LBS | HEIGHT: 66 IN | RESPIRATION RATE: 18 BRPM | TEMPERATURE: 98 F | DIASTOLIC BLOOD PRESSURE: 70 MMHG | SYSTOLIC BLOOD PRESSURE: 110 MMHG | HEART RATE: 71 BPM

## 2024-03-20 LAB
ALBUMIN SERPL ELPH-MCNC: 3.5 G/DL — SIGNIFICANT CHANGE UP (ref 3.3–5)
ALP SERPL-CCNC: 73 U/L — SIGNIFICANT CHANGE UP (ref 30–120)
ALT FLD-CCNC: 22 U/L — SIGNIFICANT CHANGE UP (ref 10–60)
ANION GAP SERPL CALC-SCNC: 7 MMOL/L — SIGNIFICANT CHANGE UP (ref 5–17)
APTT BLD: 28.8 SEC — SIGNIFICANT CHANGE UP (ref 24.5–35.6)
AST SERPL-CCNC: 17 U/L — SIGNIFICANT CHANGE UP (ref 10–40)
BASOPHILS # BLD AUTO: 0.04 K/UL — SIGNIFICANT CHANGE UP (ref 0–0.2)
BASOPHILS NFR BLD AUTO: 0.5 % — SIGNIFICANT CHANGE UP (ref 0–2)
BILIRUB SERPL-MCNC: 0.2 MG/DL — SIGNIFICANT CHANGE UP (ref 0.2–1.2)
BUN SERPL-MCNC: 18 MG/DL — SIGNIFICANT CHANGE UP (ref 7–23)
CALCIUM SERPL-MCNC: 8.3 MG/DL — LOW (ref 8.4–10.5)
CHLORIDE SERPL-SCNC: 106 MMOL/L — SIGNIFICANT CHANGE UP (ref 96–108)
CO2 SERPL-SCNC: 30 MMOL/L — SIGNIFICANT CHANGE UP (ref 22–31)
CREAT SERPL-MCNC: 0.71 MG/DL — SIGNIFICANT CHANGE UP (ref 0.5–1.3)
EGFR: 104 ML/MIN/1.73M2 — SIGNIFICANT CHANGE UP
EOSINOPHIL # BLD AUTO: 0.07 K/UL — SIGNIFICANT CHANGE UP (ref 0–0.5)
EOSINOPHIL NFR BLD AUTO: 0.9 % — SIGNIFICANT CHANGE UP (ref 0–6)
GLUCOSE SERPL-MCNC: 131 MG/DL — HIGH (ref 70–99)
HCT VFR BLD CALC: 32.3 % — LOW (ref 34.5–45)
HGB BLD-MCNC: 9.3 G/DL — LOW (ref 11.5–15.5)
IMM GRANULOCYTES NFR BLD AUTO: 0.5 % — SIGNIFICANT CHANGE UP (ref 0–0.9)
INR BLD: 0.98 RATIO — SIGNIFICANT CHANGE UP (ref 0.85–1.18)
LYMPHOCYTES # BLD AUTO: 2.19 K/UL — SIGNIFICANT CHANGE UP (ref 1–3.3)
LYMPHOCYTES # BLD AUTO: 27.4 % — SIGNIFICANT CHANGE UP (ref 13–44)
MCHC RBC-ENTMCNC: 24.2 PG — LOW (ref 27–34)
MCHC RBC-ENTMCNC: 28.8 GM/DL — LOW (ref 32–36)
MCV RBC AUTO: 84.1 FL — SIGNIFICANT CHANGE UP (ref 80–100)
MONOCYTES # BLD AUTO: 0.54 K/UL — SIGNIFICANT CHANGE UP (ref 0–0.9)
MONOCYTES NFR BLD AUTO: 6.8 % — SIGNIFICANT CHANGE UP (ref 2–14)
NEUTROPHILS # BLD AUTO: 5.12 K/UL — SIGNIFICANT CHANGE UP (ref 1.8–7.4)
NEUTROPHILS NFR BLD AUTO: 63.9 % — SIGNIFICANT CHANGE UP (ref 43–77)
NRBC # BLD: 0 /100 WBCS — SIGNIFICANT CHANGE UP (ref 0–0)
PLATELET # BLD AUTO: 321 K/UL — SIGNIFICANT CHANGE UP (ref 150–400)
POTASSIUM SERPL-MCNC: 3.9 MMOL/L — SIGNIFICANT CHANGE UP (ref 3.5–5.3)
POTASSIUM SERPL-SCNC: 3.9 MMOL/L — SIGNIFICANT CHANGE UP (ref 3.5–5.3)
PROT SERPL-MCNC: 6.6 G/DL — SIGNIFICANT CHANGE UP (ref 6–8.3)
PROTHROM AB SERPL-ACNC: 11 SEC — SIGNIFICANT CHANGE UP (ref 9.5–13)
RBC # BLD: 3.84 M/UL — SIGNIFICANT CHANGE UP (ref 3.8–5.2)
RBC # FLD: 15.3 % — HIGH (ref 10.3–14.5)
SODIUM SERPL-SCNC: 143 MMOL/L — SIGNIFICANT CHANGE UP (ref 135–145)
WBC # BLD: 8 K/UL — SIGNIFICANT CHANGE UP (ref 3.8–10.5)
WBC # FLD AUTO: 8 K/UL — SIGNIFICANT CHANGE UP (ref 3.8–10.5)

## 2024-03-20 PROCEDURE — 85025 COMPLETE CBC W/AUTO DIFF WBC: CPT

## 2024-03-20 PROCEDURE — 85610 PROTHROMBIN TIME: CPT

## 2024-03-20 PROCEDURE — 85730 THROMBOPLASTIN TIME PARTIAL: CPT

## 2024-03-20 PROCEDURE — 99284 EMERGENCY DEPT VISIT MOD MDM: CPT

## 2024-03-20 PROCEDURE — 86901 BLOOD TYPING SEROLOGIC RH(D): CPT

## 2024-03-20 PROCEDURE — 86900 BLOOD TYPING SEROLOGIC ABO: CPT

## 2024-03-20 PROCEDURE — 80053 COMPREHEN METABOLIC PANEL: CPT

## 2024-03-20 PROCEDURE — 36415 COLL VENOUS BLD VENIPUNCTURE: CPT

## 2024-03-20 PROCEDURE — 86850 RBC ANTIBODY SCREEN: CPT

## 2024-03-20 PROCEDURE — 99283 EMERGENCY DEPT VISIT LOW MDM: CPT

## 2024-03-20 RX ORDER — SODIUM CHLORIDE 9 MG/ML
1000 INJECTION INTRAMUSCULAR; INTRAVENOUS; SUBCUTANEOUS ONCE
Refills: 0 | Status: COMPLETED | OUTPATIENT
Start: 2024-03-20 | End: 2024-03-20

## 2024-03-20 RX ORDER — ESCITALOPRAM OXALATE 10 MG/1
1 TABLET, FILM COATED ORAL
Refills: 0 | DISCHARGE

## 2024-03-20 RX ORDER — SODIUM CHLORIDE 9 MG/ML
500 INJECTION INTRAMUSCULAR; INTRAVENOUS; SUBCUTANEOUS ONCE
Refills: 0 | Status: COMPLETED | OUTPATIENT
Start: 2024-03-20 | End: 2024-03-20

## 2024-03-20 RX ORDER — ZOLPIDEM TARTRATE 10 MG/1
1 TABLET ORAL
Refills: 0 | DISCHARGE

## 2024-03-20 RX ORDER — ALPRAZOLAM 0.25 MG
1 TABLET ORAL
Refills: 0 | DISCHARGE

## 2024-03-20 RX ADMIN — SODIUM CHLORIDE 500 MILLILITER(S): 9 INJECTION INTRAMUSCULAR; INTRAVENOUS; SUBCUTANEOUS at 21:06

## 2024-03-20 RX ADMIN — SODIUM CHLORIDE 500 MILLILITER(S): 9 INJECTION INTRAMUSCULAR; INTRAVENOUS; SUBCUTANEOUS at 21:26

## 2024-03-20 RX ADMIN — SODIUM CHLORIDE 1000 MILLILITER(S): 9 INJECTION INTRAMUSCULAR; INTRAVENOUS; SUBCUTANEOUS at 21:27

## 2024-03-20 NOTE — ED ADULT NURSE NOTE - NSICDXPASTMEDICALHX_GEN_ALL_CORE_FT
PAST MEDICAL HISTORY:  Anxiety     Breast cancer     Depression     Insomnia     Menorrhagia     Panic attack

## 2024-03-20 NOTE — ED PROVIDER NOTE - PROGRESS NOTE DETAILS
pt feeling improved, skin color does appear better, vs checked and wnl, ambulated to bathroom without incident, hb is at baseline, will dc home

## 2024-03-20 NOTE — ED ADULT NURSE NOTE - NSFALLRISKINTERV_ED_ALL_ED

## 2024-03-20 NOTE — ED PROVIDER NOTE - CLINICAL SUMMARY MEDICAL DECISION MAKING FREE TEXT BOX
h/o menorrhagia, has menses now, feeling weak and lightheaded, will check labs, may require transfusion

## 2024-03-20 NOTE — ED PROVIDER NOTE - NSFOLLOWUPINSTRUCTIONS_ED_ALL_ED_FT
Menorrhagia  Menorrhagia is a form of abnormal uterine bleeding in which menstrual periods are heavy or last longer than normal. With menorrhagia, the periods may cause enough blood loss and cramping that a woman becomes unable to take part in her usual activities.    What are the causes?  Common causes of this condition include:  Polyps or fibroids. These are noncancerous growths in the uterus.  An imbalance of the hormones estrogen and progesterone.  Anovulation, which occurs when one of the ovaries does not release an egg during one or more months.  A problem with the thyroid gland (hypothyroidism).  Side effects of having an intrauterine device (IUD).  Side effects of some medicines, such as NSAIDs or blood thinners.  A bleeding disorder that stops the blood from clotting normally.  In some cases, the cause of this condition is not known.    What increases the risk?  You are more likely to develop this condition if you have cancer of the uterus.    What are the signs or symptoms?  Symptoms of this condition include:  Routinely having to change your pad or tampon every 1–2 hours because it is soaked.  Needing to use pads and tampons at the same time because of heavy bleeding.  Needing to wake up to change your pads or tampons during the night.  Passing blood clots larger than 1 inch (2.5 cm) in size.  Having bleeding that lasts for more than 7 days.  Having symptoms of low iron levels (anemia), such as tiredness (fatigue) or shortness of breath.  How is this diagnosed?  This condition may be diagnosed based on:  A physical exam.  Your symptoms and menstrual history.  Tests, such as:  Blood tests to check if you are pregnant or if you have hormonal changes, a bleeding or thyroid disorder, anemia, or other problems.  Pap test to check for cancerous changes, infections, or inflammation.  Endometrial biopsy. This test involves removing a tissue sample from the lining of the uterus (endometrium) to be examined under a microscope.  Pelvic ultrasound. This test uses sound waves to create images of your uterus, ovaries, and vagina. The images can show if you have fibroids or other growths.  Hysteroscopy. For this test, a thin, flexible tube with a light on the end (hysteroscope) is used to look inside your uterus.  How is this treated?  Treatment may not be needed for this condition. If it is needed, the best treatment for you will depend on:  Whether you need to prevent pregnancy.  Your desire to have children in the future.  The cause and severity of your bleeding.  Your personal preference.  Medicine      Medicines are the first step in treatment. You may be treated with:  Hormonal birth control methods. These treatments reduce bleeding during your menstrual period. They include:  Birth control pills.  Skin patch.  Vaginal ring.  Shots (injections) that you get every 3 months.  Hormonal IUD.  Implants that go under the skin.  Medicines that thicken the blood and slow bleeding.  Medicines that reduce swelling, such as ibuprofen.  Medicines that contain an artificial (synthetic) hormone called progestin.  Medicines that make the ovaries stop working for a short time.  Iron supplements to treat anemia.  Surgery    If medicines do not work, surgery may be done. Surgical options may include:  Dilation and curettage (D&C). In this procedure, your health care provider opens the lowest part of the uterus (cervix) and then scrapes or suctions tissue from the endometrium. This reduces menstrual bleeding.  Operative hysteroscopy. In this procedure, a hysteroscope is used to view your uterus and help remove polyps that may be causing heavy periods.  Endometrial ablation. This is when various techniques are used to permanently destroy your entire endometrium. After endometrial ablation, most women have little or no menstrual flow. This procedure reduces your ability to become pregnant.  Endometrial resection. In this procedure, an electrosurgical wire loop is used to remove the endometrium. This procedure reduces your ability to become pregnant.  Hysterectomy. This is surgical removal of your uterus. This is a permanent procedure that stops menstrual periods. Pregnancy is not possible after a hysterectomy.  Follow these instructions at home:  Medicines    Take over-the-counter and prescription medicines only as told by your health care provider. This includes iron pills.  Do not change or switch medicines without asking your health care provider.  Do not take aspirin or medicines that contain aspirin 1 week before or during your menstrual period. Aspirin may make bleeding worse.  Managing constipation    Your iron pills may cause constipation. If you are taking prescription iron supplements, you may need to take these actions to prevent or treat constipation:  Drink enough fluid to keep your urine pale yellow.  Take over-the-counter or prescription medicines.  Eat foods that are high in fiber, such as beans, whole grains, and fresh fruits and vegetables.  Limit foods that are high in fat and processed sugars, such as fried or sweet foods.  General instructions    If you need to change your sanitary pad or tampon more than once every 2 hours, limit your activity until the bleeding stops.  Eat well-balanced meals, including foods that are high in iron. Foods that have a lot of iron include leafy green vegetables, meat, liver, eggs, and whole-grain breads and cereals.  Do not try to lose weight until the abnormal bleeding has stopped and your blood iron level is back to normal. If you need to lose weight, work with your health care provider to lose weight safely.  Keep all follow-up visits. This is important.  Contact a health care provider if:  You soak through a pad or tampon every 1 or 2 hours, and this happens every time you have a period.  You need to use pads and tampons at the same time because you are bleeding so much.  You have nausea, vomiting, diarrhea, or other problems related to medicines you are taking.  Get help right away if:  You soak through more than a pad or tampon in 1 hour.  You pass clots bigger than 1 inch (2.5 cm) wide.  You feel short of breath.  You feel like your heart is beating too fast.  You feel dizzy or you faint.  You feel very weak or tired.  Summary  Menorrhagia is a form of abnormal uterine bleeding in which menstrual periods are heavy or last longer than normal.  Treatment may not be needed for this condition. If it is needed, it may include medicines or procedures.  Take over-the-counter and prescription medicines only as told by your health care provider. This includes iron pills.  Get help right away if you have heavy bleeding that soaks through more than a pad or tampon in 1 hour, you pass large clots, or you feel dizzy, short of breath, or very weak or tired.  This information is not intended to replace advice given to you by your health care provider. Make sure you discuss any questions you have with your health care provider.

## 2024-03-20 NOTE — ED PROVIDER NOTE - PATIENT PORTAL LINK FT
You can access the FollowMyHealth Patient Portal offered by [facility] by registering at the following website: [email or web address]. By joining [employer]’s FollowMyHealth portal, you will also be able to view your health information using other applications (apps) compatible with our system.

## 2024-03-20 NOTE — ED PROVIDER NOTE - OBJECTIVE STATEMENT
48 y F BIB friend for menorrhagia and dizziness - pt has h/o menorrhagia, was on tamoxifen for breast ca, ended 3 months ago, since then has had heavier bleeding, 1-2 months ago hb about 9 at PCP, has spoken to GYN previously about D&C or possible hysterectomy, now on menses x 3d, very heavy, changing cup every 2-3 hours, 48 y F BIB friend for menorrhagia and dizziness - pt has h/o menorrhagia, was on tamoxifen for breast ca, ended 3 months ago, since then has had heavier bleeding, 1-2 months ago hb about 9 at PCP, has spoken to GYN previously about D&C or possible hysterectomy, now on menses x 3d, very heavy, changing cup every 2-3 hours, does feel a little sob at times, no cp, did take a xanax 1mg shortly prior to coming to ED

## 2025-01-24 PROBLEM — N92.0 EXCESSIVE AND FREQUENT MENSTRUATION WITH REGULAR CYCLE: Chronic | Status: ACTIVE | Noted: 2024-03-20

## 2025-01-24 PROBLEM — G47.00 INSOMNIA, UNSPECIFIED: Chronic | Status: ACTIVE | Noted: 2024-03-20

## 2025-01-24 PROBLEM — F41.0 PANIC DISORDER [EPISODIC PAROXYSMAL ANXIETY]: Chronic | Status: ACTIVE | Noted: 2024-03-20

## 2025-01-24 PROBLEM — F32.A DEPRESSION, UNSPECIFIED: Chronic | Status: ACTIVE | Noted: 2024-03-20

## 2025-01-24 PROBLEM — F41.9 ANXIETY DISORDER, UNSPECIFIED: Chronic | Status: ACTIVE | Noted: 2024-03-20

## 2025-01-24 PROBLEM — C50.919 MALIGNANT NEOPLASM OF UNSPECIFIED SITE OF UNSPECIFIED FEMALE BREAST: Chronic | Status: ACTIVE | Noted: 2024-03-20

## 2025-01-25 ENCOUNTER — INPATIENT (INPATIENT)
Facility: HOSPITAL | Age: 50
LOS: 0 days | Discharge: HOME | DRG: 812 | End: 2025-01-26
Attending: INTERNAL MEDICINE | Admitting: HOSPITALIST
Payer: MEDICAID

## 2025-01-25 VITALS
HEIGHT: 65 IN | RESPIRATION RATE: 18 BRPM | DIASTOLIC BLOOD PRESSURE: 78 MMHG | SYSTOLIC BLOOD PRESSURE: 112 MMHG | WEIGHT: 115.08 LBS | HEART RATE: 78 BPM | OXYGEN SATURATION: 100 % | TEMPERATURE: 98 F

## 2025-01-25 LAB
ALBUMIN SERPL ELPH-MCNC: 3.5 G/DL — SIGNIFICANT CHANGE UP (ref 3.3–5)
ALP SERPL-CCNC: 72 U/L — SIGNIFICANT CHANGE UP (ref 30–120)
ALT FLD-CCNC: 14 U/L — SIGNIFICANT CHANGE UP (ref 10–60)
ANION GAP SERPL CALC-SCNC: 8 MMOL/L — SIGNIFICANT CHANGE UP (ref 5–17)
APPEARANCE UR: CLEAR — SIGNIFICANT CHANGE UP
APTT BLD: 28.9 SEC — SIGNIFICANT CHANGE UP (ref 24.5–35.6)
AST SERPL-CCNC: 16 U/L — SIGNIFICANT CHANGE UP (ref 10–40)
BASOPHILS # BLD AUTO: 0.01 K/UL — SIGNIFICANT CHANGE UP (ref 0–0.2)
BASOPHILS NFR BLD AUTO: 0.2 % — SIGNIFICANT CHANGE UP (ref 0–2)
BILIRUB SERPL-MCNC: 0.2 MG/DL — SIGNIFICANT CHANGE UP (ref 0.2–1.2)
BILIRUB UR-MCNC: NEGATIVE — SIGNIFICANT CHANGE UP
BLD GP AB SCN SERPL QL: SIGNIFICANT CHANGE UP
BUN SERPL-MCNC: 11 MG/DL — SIGNIFICANT CHANGE UP (ref 7–23)
CALCIUM SERPL-MCNC: 8.3 MG/DL — LOW (ref 8.4–10.5)
CHLORIDE SERPL-SCNC: 103 MMOL/L — SIGNIFICANT CHANGE UP (ref 96–108)
CO2 SERPL-SCNC: 26 MMOL/L — SIGNIFICANT CHANGE UP (ref 22–31)
COLOR SPEC: YELLOW — SIGNIFICANT CHANGE UP
CREAT SERPL-MCNC: 0.7 MG/DL — SIGNIFICANT CHANGE UP (ref 0.5–1.3)
DIFF PNL FLD: NEGATIVE — SIGNIFICANT CHANGE UP
EGFR: 105 ML/MIN/1.73M2 — SIGNIFICANT CHANGE UP
EOSINOPHIL # BLD AUTO: 0.05 K/UL — SIGNIFICANT CHANGE UP (ref 0–0.5)
EOSINOPHIL NFR BLD AUTO: 1.1 % — SIGNIFICANT CHANGE UP (ref 0–6)
GLUCOSE SERPL-MCNC: 85 MG/DL — SIGNIFICANT CHANGE UP (ref 70–99)
GLUCOSE UR QL: NEGATIVE MG/DL — SIGNIFICANT CHANGE UP
HCG UR QL: NEGATIVE — SIGNIFICANT CHANGE UP
HCT VFR BLD CALC: 22.7 % — LOW (ref 34.5–45)
HGB BLD-MCNC: 5.7 G/DL — CRITICAL LOW (ref 11.5–15.5)
IMM GRANULOCYTES NFR BLD AUTO: 0.4 % — SIGNIFICANT CHANGE UP (ref 0–0.9)
INR BLD: 0.92 RATIO — SIGNIFICANT CHANGE UP (ref 0.85–1.16)
KETONES UR-MCNC: NEGATIVE MG/DL — SIGNIFICANT CHANGE UP
LEUKOCYTE ESTERASE UR-ACNC: NEGATIVE — SIGNIFICANT CHANGE UP
LIDOCAIN IGE QN: 24 U/L — SIGNIFICANT CHANGE UP (ref 16–77)
LYMPHOCYTES # BLD AUTO: 1.35 K/UL — SIGNIFICANT CHANGE UP (ref 1–3.3)
LYMPHOCYTES # BLD AUTO: 28.6 % — SIGNIFICANT CHANGE UP (ref 13–44)
MCHC RBC-ENTMCNC: 16.8 PG — LOW (ref 27–34)
MCHC RBC-ENTMCNC: 25.1 G/DL — LOW (ref 32–36)
MCV RBC AUTO: 67 FL — LOW (ref 80–100)
MONOCYTES # BLD AUTO: 0.45 K/UL — SIGNIFICANT CHANGE UP (ref 0–0.9)
MONOCYTES NFR BLD AUTO: 9.5 % — SIGNIFICANT CHANGE UP (ref 2–14)
NEUTROPHILS # BLD AUTO: 2.84 K/UL — SIGNIFICANT CHANGE UP (ref 1.8–7.4)
NEUTROPHILS NFR BLD AUTO: 60.2 % — SIGNIFICANT CHANGE UP (ref 43–77)
NITRITE UR-MCNC: NEGATIVE — SIGNIFICANT CHANGE UP
NRBC # BLD: 0 /100 WBCS — SIGNIFICANT CHANGE UP (ref 0–0)
NRBC BLD-RTO: 0 /100 WBCS — SIGNIFICANT CHANGE UP (ref 0–0)
PH UR: 7.5 — SIGNIFICANT CHANGE UP (ref 5–8)
PLATELET # BLD AUTO: 319 K/UL — SIGNIFICANT CHANGE UP (ref 150–400)
POTASSIUM SERPL-MCNC: 4.2 MMOL/L — SIGNIFICANT CHANGE UP (ref 3.5–5.3)
POTASSIUM SERPL-SCNC: 4.2 MMOL/L — SIGNIFICANT CHANGE UP (ref 3.5–5.3)
PROT SERPL-MCNC: 6.8 G/DL — SIGNIFICANT CHANGE UP (ref 6–8.3)
PROT UR-MCNC: NEGATIVE MG/DL — SIGNIFICANT CHANGE UP
PROTHROM AB SERPL-ACNC: 10.9 SEC — SIGNIFICANT CHANGE UP (ref 9.9–13.4)
RBC # BLD: 3.39 M/UL — LOW (ref 3.8–5.2)
RBC # FLD: 20.7 % — HIGH (ref 10.3–14.5)
SODIUM SERPL-SCNC: 137 MMOL/L — SIGNIFICANT CHANGE UP (ref 135–145)
SP GR SPEC: 1 — SIGNIFICANT CHANGE UP (ref 1–1.03)
UROBILINOGEN FLD QL: 0.2 MG/DL — SIGNIFICANT CHANGE UP (ref 0.2–1)
WBC # BLD: 4.72 K/UL — SIGNIFICANT CHANGE UP (ref 3.8–10.5)
WBC # FLD AUTO: 4.72 K/UL — SIGNIFICANT CHANGE UP (ref 3.8–10.5)

## 2025-01-25 PROCEDURE — 76830 TRANSVAGINAL US NON-OB: CPT | Mod: 26

## 2025-01-25 PROCEDURE — 71045 X-RAY EXAM CHEST 1 VIEW: CPT | Mod: 26

## 2025-01-25 PROCEDURE — 93010 ELECTROCARDIOGRAM REPORT: CPT

## 2025-01-25 PROCEDURE — 99285 EMERGENCY DEPT VISIT HI MDM: CPT

## 2025-01-25 RX ORDER — ONDANSETRON 4 MG/1
4 TABLET, ORALLY DISINTEGRATING ORAL ONCE
Refills: 0 | Status: COMPLETED | OUTPATIENT
Start: 2025-01-25 | End: 2025-01-25

## 2025-01-25 RX ORDER — ACETAMINOPHEN 160 MG/5ML
975 SUSPENSION ORAL ONCE
Refills: 0 | Status: COMPLETED | OUTPATIENT
Start: 2025-01-25 | End: 2025-01-25

## 2025-01-25 RX ADMIN — ACETAMINOPHEN 975 MILLIGRAM(S): 160 SUSPENSION ORAL at 20:08

## 2025-01-25 RX ADMIN — ACETAMINOPHEN 975 MILLIGRAM(S): 160 SUSPENSION ORAL at 21:08

## 2025-01-25 RX ADMIN — ONDANSETRON 4 MILLIGRAM(S): 4 TABLET, ORALLY DISINTEGRATING ORAL at 20:08

## 2025-01-25 NOTE — ED PROVIDER NOTE - CARE PROVIDER_API CALL
your ob/gyn,   Phone: (   )    -  Fax: (   )    -  Follow Up Time: 1-3 Days    Renee Ramos  Obstetrics and Gynecology  [address] [place] [address]  Phone: [phone]  Fax: [phone]  Follow Up Time:     Lucien Dominguez)  Medical Oncology  [address] [place] [address], [address]  [place] [address]  Phone: [phone]  Fax: [phone]  Follow Up Time:

## 2025-01-25 NOTE — ED PROVIDER NOTE - PROVIDER TOKENS
FREE:[LAST:[your ob/gyn],PHONE:[(   )    -],FAX:[(   )    -],FOLLOWUP:[1-3 Days]],PROVIDER:[TOKEN:[[id number]]],PROVIDER:[TOKEN:[[id number]]]

## 2025-01-25 NOTE — ED PROVIDER NOTE - CLINICAL SUMMARY MEDICAL DECISION MAKING FREE TEXT BOX
Patient is a 49-year-old female history of anxiety, depression, breast cancer, menorrhagia, anemia who presents emergency department for anemia.  Patient had outpatient lab work done showing hemoglobin of 5.4.  Does endorse shortness of breath and fatigue.  Has had similar symptoms before.  Has been referred to hematology, has not seen yet.  Discussed with GI for endoscopy, colonoscopy, however not able to have procedure done yet until anemia is corrected.  Patient seen by GYN, has known fibroids, watching at this time.  Patient states she has long periods for approximately 2 weeks at a time with heavy bleeding during this time.  Denies hemoptysis, hematemesis, hematuria, melena, medic easier.    General: well appearing, no acute distress, appropriate weight  Cardiac: heart RRR, no murmurs, rubs, gallops, pulses 2+ x4  Pulm: lungs CTA  GI: abdomen soft, nontender  Neuro: A&Ox3,   Skin: warm, dry, no laceration, abrasion, contusion. pale appearing.     will obtain labs, TVUS transfuse and reeval.

## 2025-01-25 NOTE — ED PROVIDER NOTE - NSFOLLOWUPINSTRUCTIONS_ED_ALL_ED_FT
Anemia    WHAT YOU NEED TO KNOW:    What is anemia? Anemia is a low number of red blood cells or a low amount of hemoglobin in your red blood cells. Hemoglobin is a protein that helps carry oxygen throughout your body. Red blood cells use iron to create hemoglobin. Anemia may develop if your body does not have enough iron. It may also develop if your body does not make enough red blood cells or they die faster than your body can make them.    What increases my risk for anemia?    Trauma or surgery that causes massive blood loss    A gastrointestinal bleed    A woman's monthly period    A family history of blood disease or anemia    Liver or kidney disease, cancer, rheumatoid arthritis, or hyperthyroidism    Alcohol abuse    Lack of foods that contain iron, folic acid, or vitamin B12  What are the signs and symptoms of anemia?    Chest pain or a fast heartbeat    Lightheadedness, dizziness, or shortness of breath    Cold or pale skin    Tiredness, weakness, or confusion  How is anemia diagnosed? Blood tests will show if you have anemia.    How is anemia treated? Treatment depends on the type of anemia you have. You may need any of the following:    Iron or folic acid supplements help increase your red blood cell and hemoglobin levels.    Vitamin B12 injections may help boost your red blood cell count and decrease your symptoms.    A blood transfusion may be needed if your body cannot replace the blood you have lost.    Surgery may be needed to stop bleeding, or if your anemia is severe.  How can I prevent anemia? Eat healthy foods rich in iron and vitamin C. Nuts, meat, dark leafy green vegetables, and beans are high in iron and protein. Vitamin C helps your body absorb iron. Foods rich in vitamin C include oranges and other citrus fruits. Ask your healthcare provider for a list of other foods that are high in iron or vitamin C. Ask if you need to be on a special diet.  Sources of Iron  Sources of Vitamin C    Call your local emergency number (911 in the [place]), or have someone call if:    You lose consciousness.    You have severe chest pain.  When should I seek immediate care?    You have dark or bloody bowel movements.    When should I call my doctor?    Your symptoms are worse, even after treatment.    You have questions or concerns about your condition or care.  CARE AGREEMENT:    You have the right to help plan your care. Learn about your health condition and how it may be treated. Discuss treatment options with your healthcare providers to decide what care you want to receive. You always have the right to refuse treatment.

## 2025-01-25 NOTE — ED PROVIDER NOTE - CARE PLAN
1 Principal Discharge DX:	Symptomatic anemia   Principal Discharge DX:	Symptomatic anemia  Secondary Diagnosis:	Uterine fibroid

## 2025-01-25 NOTE — ED PROVIDER NOTE - DIFFERENTIAL DIAGNOSIS
differentials  include but not limited to anemia, dehydration, electrolyte abnormality Differential Diagnosis

## 2025-01-25 NOTE — ED PROVIDER NOTE - OBJECTIVE STATEMENT
49-year-old female with history of panic attacks, insomnia, depression, anxiety, breast cancer, and menorrhagia presents to emergency room with low hemoglobin and need for transfusion.  Patient does have a longstanding history of heavy menstrual cycles and fibroids.  Patient had fibroids removed about 4 to 5 years ago twice.  Has been seen by OB/GYN but was told fibroids are not as large as before and will likely shrink around menopause.  Patient states menstrual cycles have been getting heavier and longer.  States menstrual cycles will last for about 2 weeks.  Last menstrual cycle January 1 and lasted for 2 weeks.  No current bleeding.  Patient has had some generalized fatigue, weakness, shortness of breath with exertion the past few months.  Was seen at this emergency room for similar symptoms 2 to 3 months ago.  States at that time hemoglobin was 9.6 and discharged home.  Symptoms have been progressively getting worse.  Continues to feel lightheaded, weakness, and shortness of breath with exertion.  Will occ  feel dizzy.  Mild intermittent headaches.  States has been sleeping a lot more than usual.  Was seen by primary care doctor a couple days ago.  Was referred to hematology to set up iron infusions.  Was also set up with GI for endoscopy and colonoscopy due to increased acid and some lower abdominal discomfort past few months.  Was called and told hemoglobin was 5.5 and to go to emergency room for transfusion.  States endoscopy and colonoscopy was canceled due to low hemoglobin.  Denies history of iron transfusion or blood transfusion in the past.  No chest pain  PCP Dr. Sunny Gaston ([place])  [facility] ([place])

## 2025-01-25 NOTE — ED PROVIDER NOTE - CARE PROVIDERS DIRECT ADDRESSES
,DirectAddress_Unknown,DirectAddress_Unknown,[email or web address]

## 2025-01-25 NOTE — ED ADULT NURSE REASSESSMENT NOTE - NS ED NURSE REASSESS COMMENT FT1
Pt blood transfusion started at 1940. Pt tolerating blood transfusion. Pt will be going to ultrasound.

## 2025-01-25 NOTE — ED ADULT TRIAGE NOTE - CHIEF COMPLAINT QUOTE
" My PCP , Dr Gaston called me and took me to come to here for BT - my hgb is low  5.5 "   Pt is pale with occasional  dizziness and headache SOB  No active bleeding

## 2025-01-25 NOTE — ED PROVIDER NOTE - PROGRESS NOTE DETAILS
hemoglobin 5.7. will transfuse I, Dr. Tovar, signed this patient out at 1915 to Dr. Kaba resting comfortably. US shows fibroid uterus. has plans to follow up with hematology and ob-gyn outpatient. PRBC infusing. Will reassess after 2nd unit infused. if feeling better, will consider discharge with close outpatient follow up. Dr. Kaba will assume care Patient persistently dizzy, more so on standing.  Feels uncomfortable with discharge.  Will admit to hospital for possible further transfusions and GYN/hematology evaluation.

## 2025-01-26 VITALS
HEART RATE: 63 BPM | TEMPERATURE: 98 F | OXYGEN SATURATION: 98 % | SYSTOLIC BLOOD PRESSURE: 81 MMHG | RESPIRATION RATE: 16 BRPM | DIASTOLIC BLOOD PRESSURE: 54 MMHG

## 2025-01-26 DIAGNOSIS — D64.9 ANEMIA, UNSPECIFIED: ICD-10-CM

## 2025-01-26 LAB
HCT VFR BLD CALC: 28.2 % — LOW (ref 34.5–45)
HGB BLD-MCNC: 8 G/DL — LOW (ref 11.5–15.5)
MCHC RBC-ENTMCNC: 20.2 PG — LOW (ref 27–34)
MCHC RBC-ENTMCNC: 28.4 G/DL — LOW (ref 32–36)
MCV RBC AUTO: 71 FL — LOW (ref 80–100)
NRBC # BLD: 0 /100 WBCS — SIGNIFICANT CHANGE UP (ref 0–0)
NRBC BLD-RTO: 0 /100 WBCS — SIGNIFICANT CHANGE UP (ref 0–0)
PLATELET # BLD AUTO: 309 K/UL — SIGNIFICANT CHANGE UP (ref 150–400)
RBC # BLD: 3.97 M/UL — SIGNIFICANT CHANGE UP (ref 3.8–5.2)
RBC # FLD: 20.6 % — HIGH (ref 10.3–14.5)
WBC # BLD: 6.07 K/UL — SIGNIFICANT CHANGE UP (ref 3.8–10.5)
WBC # FLD AUTO: 6.07 K/UL — SIGNIFICANT CHANGE UP (ref 3.8–10.5)

## 2025-01-26 PROCEDURE — 85730 THROMBOPLASTIN TIME PARTIAL: CPT

## 2025-01-26 PROCEDURE — 99236 HOSP IP/OBS SAME DATE HI 85: CPT

## 2025-01-26 PROCEDURE — 93005 ELECTROCARDIOGRAM TRACING: CPT

## 2025-01-26 PROCEDURE — 99285 EMERGENCY DEPT VISIT HI MDM: CPT | Mod: 25

## 2025-01-26 PROCEDURE — 36415 COLL VENOUS BLD VENIPUNCTURE: CPT

## 2025-01-26 PROCEDURE — 85025 COMPLETE CBC W/AUTO DIFF WBC: CPT

## 2025-01-26 PROCEDURE — 80053 COMPREHEN METABOLIC PANEL: CPT

## 2025-01-26 PROCEDURE — 81025 URINE PREGNANCY TEST: CPT

## 2025-01-26 PROCEDURE — 85610 PROTHROMBIN TIME: CPT

## 2025-01-26 PROCEDURE — 86850 RBC ANTIBODY SCREEN: CPT

## 2025-01-26 PROCEDURE — 71045 X-RAY EXAM CHEST 1 VIEW: CPT

## 2025-01-26 PROCEDURE — 85027 COMPLETE CBC AUTOMATED: CPT

## 2025-01-26 PROCEDURE — 86901 BLOOD TYPING SEROLOGIC RH(D): CPT

## 2025-01-26 PROCEDURE — 76830 TRANSVAGINAL US NON-OB: CPT

## 2025-01-26 PROCEDURE — 83690 ASSAY OF LIPASE: CPT

## 2025-01-26 PROCEDURE — 99223 1ST HOSP IP/OBS HIGH 75: CPT

## 2025-01-26 PROCEDURE — 81003 URINALYSIS AUTO W/O SCOPE: CPT

## 2025-01-26 PROCEDURE — 86900 BLOOD TYPING SEROLOGIC ABO: CPT

## 2025-01-26 PROCEDURE — 86923 COMPATIBILITY TEST ELECTRIC: CPT

## 2025-01-26 PROCEDURE — P9016: CPT

## 2025-01-26 RX ORDER — OXYCODONE HYDROCHLORIDE AND ACETAMINOPHEN 5; 325 MG/1; MG/1
1 TABLET ORAL EVERY 4 HOURS
Refills: 0 | Status: DISCONTINUED | OUTPATIENT
Start: 2025-01-26 | End: 2025-01-26

## 2025-01-26 RX ORDER — ZOLPIDEM TARTRATE 5 MG/1
5 TABLET, COATED ORAL AT BEDTIME
Refills: 0 | Status: DISCONTINUED | OUTPATIENT
Start: 2025-01-26 | End: 2025-01-26

## 2025-01-26 RX ORDER — FAMOTIDINE 10 MG/ML
40 INJECTION INTRAVENOUS
Refills: 0 | Status: DISCONTINUED | OUTPATIENT
Start: 2025-01-26 | End: 2025-01-26

## 2025-01-26 RX ORDER — ACETAMINOPHEN 160 MG/5ML
2 SUSPENSION ORAL
Qty: 0 | Refills: 0 | DISCHARGE
Start: 2025-01-26

## 2025-01-26 RX ORDER — MORPHINE SULFATE 60 MG/1
2 TABLET, FILM COATED, EXTENDED RELEASE ORAL ONCE
Refills: 0 | Status: DISCONTINUED | OUTPATIENT
Start: 2025-01-26 | End: 2025-01-26

## 2025-01-26 RX ORDER — ACETAMINOPHEN, DIPHENHYDRAMINE HCL, PHENYLEPHRINE HCL 325; 25; 5 MG/1; MG/1; MG/1
3 TABLET ORAL AT BEDTIME
Refills: 0 | Status: DISCONTINUED | OUTPATIENT
Start: 2025-01-26 | End: 2025-01-26

## 2025-01-26 RX ORDER — DIPHENHYDRAMINE HCL 25 MG
50 CAPSULE ORAL EVERY 6 HOURS
Refills: 0 | Status: DISCONTINUED | OUTPATIENT
Start: 2025-01-26 | End: 2025-01-26

## 2025-01-26 RX ORDER — IRON SUCROSE 20 MG/ML
200 INJECTION, SOLUTION INTRAVENOUS ONCE
Refills: 0 | Status: DISCONTINUED | OUTPATIENT
Start: 2025-01-26 | End: 2025-01-26

## 2025-01-26 RX ORDER — POLYETHYLENE GLYCOL 3350 17 G/17G
17 POWDER, FOR SOLUTION ORAL DAILY
Refills: 0 | Status: DISCONTINUED | OUTPATIENT
Start: 2025-01-26 | End: 2025-01-26

## 2025-01-26 RX ORDER — MAGNESIUM, ALUMINUM HYDROXIDE 200-225/5
30 SUSPENSION, ORAL (FINAL DOSE FORM) ORAL EVERY 4 HOURS
Refills: 0 | Status: DISCONTINUED | OUTPATIENT
Start: 2025-01-26 | End: 2025-01-26

## 2025-01-26 RX ORDER — IRON SUCROSE 20 MG/ML
200 INJECTION, SOLUTION INTRAVENOUS ONCE
Refills: 0 | Status: COMPLETED | OUTPATIENT
Start: 2025-01-26 | End: 2025-01-26

## 2025-01-26 RX ORDER — ALPRAZOLAM 2 MG
0.25 TABLET ORAL THREE TIMES A DAY
Refills: 0 | Status: DISCONTINUED | OUTPATIENT
Start: 2025-01-26 | End: 2025-01-26

## 2025-01-26 RX ORDER — IBUPROFEN 600 MG/1
400 TABLET, FILM COATED ORAL EVERY 6 HOURS
Refills: 0 | Status: DISCONTINUED | OUTPATIENT
Start: 2025-01-26 | End: 2025-01-26

## 2025-01-26 RX ORDER — ACETAMINOPHEN 160 MG/5ML
650 SUSPENSION ORAL EVERY 6 HOURS
Refills: 0 | Status: DISCONTINUED | OUTPATIENT
Start: 2025-01-26 | End: 2025-01-26

## 2025-01-26 RX ORDER — SENNOSIDES 8.6 MG
2 TABLET ORAL AT BEDTIME
Refills: 0 | Status: DISCONTINUED | OUTPATIENT
Start: 2025-01-26 | End: 2025-01-26

## 2025-01-26 RX ORDER — FAMOTIDINE 10 MG/ML
1 INJECTION INTRAVENOUS
Qty: 60 | Refills: 0
Start: 2025-01-26 | End: 2025-02-24

## 2025-01-26 RX ORDER — TRAZODONE HCL 100 MG
50 TABLET ORAL AT BEDTIME
Refills: 0 | Status: DISCONTINUED | OUTPATIENT
Start: 2025-01-26 | End: 2025-01-26

## 2025-01-26 RX ORDER — DIPHENHYDRAMINE HCL 25 MG
50 CAPSULE ORAL ONCE
Refills: 0 | Status: COMPLETED | OUTPATIENT
Start: 2025-01-26 | End: 2025-01-26

## 2025-01-26 RX ORDER — PANTOPRAZOLE 20 MG/1
40 TABLET, DELAYED RELEASE ORAL
Refills: 0 | Status: DISCONTINUED | OUTPATIENT
Start: 2025-01-26 | End: 2025-01-26

## 2025-01-26 RX ORDER — ESCITALOPRAM 10 MG/1
20 TABLET, FILM COATED ORAL DAILY
Refills: 0 | Status: DISCONTINUED | OUTPATIENT
Start: 2025-01-26 | End: 2025-01-26

## 2025-01-26 RX ORDER — ONDANSETRON 4 MG/1
4 TABLET, ORALLY DISINTEGRATING ORAL EVERY 8 HOURS
Refills: 0 | Status: DISCONTINUED | OUTPATIENT
Start: 2025-01-26 | End: 2025-01-26

## 2025-01-26 RX ADMIN — PANTOPRAZOLE 40 MILLIGRAM(S): 20 TABLET, DELAYED RELEASE ORAL at 10:07

## 2025-01-26 RX ADMIN — MORPHINE SULFATE 2 MILLIGRAM(S): 60 TABLET, FILM COATED, EXTENDED RELEASE ORAL at 11:46

## 2025-01-26 RX ADMIN — ESCITALOPRAM 20 MILLIGRAM(S): 10 TABLET, FILM COATED ORAL at 12:13

## 2025-01-26 RX ADMIN — POLYETHYLENE GLYCOL 3350 17 GRAM(S): 17 POWDER, FOR SOLUTION ORAL at 16:18

## 2025-01-26 RX ADMIN — Medication 50 MILLIGRAM(S): at 10:50

## 2025-01-26 RX ADMIN — IRON SUCROSE 110 MILLIGRAM(S): 20 INJECTION, SOLUTION INTRAVENOUS at 12:25

## 2025-01-26 RX ADMIN — MORPHINE SULFATE 2 MILLIGRAM(S): 60 TABLET, FILM COATED, EXTENDED RELEASE ORAL at 12:16

## 2025-01-26 NOTE — H&P ADULT - NSHPLABSRESULTS_GEN_ALL_CORE
Labs:                          8.0    6.07  )-----------( 309      ( 2025 02:28 )             28.2           137  |  103  |  11  ----------------------------<  85  4.2   |  26  |  0.70    Ca    8.3[L]      2025 18:29    TPro  6.8  /  Alb  3.5  /  TBili  0.2  /  DBili  x   /  AST  16  /  ALT  14  /  AlkPhos  72                Urinalysis Basic - ( 2025 20:28 )    Color: Yellow / Appearance: Clear / S.005 / pH: x  Gluc: x / Ketone: Negative mg/dL  / Bili: Negative / Urobili: 0.2 mg/dL   Blood: x / Protein: Negative mg/dL / Nitrite: Negative   Leuk Esterase: Negative / RBC: x / WBC x   Sq Epi: x / Non Sq Epi: x / Bacteria: x        PT/INR - ( 2025 18:29 )   PT: 10.9 sec;   INR: 0.92 ratio         PTT - ( 2025 18:29 )  PTT:28.9 sec    Lactate Trend            CAPILLARY BLOOD GLUCOSE          EKG:   Personally Reviewed:  [ ] YES     Imaging:   Personally Reviewed:  [ ] YES

## 2025-01-26 NOTE — PATIENT CHOICE NOTE. - NSPTCHOICENOTES_GEN_ALL_CORE
CM provided list of [facility] and [facility] , however pt. does not think she will need any services post acute as she is independent and drives.

## 2025-01-26 NOTE — CARE COORDINATION ASSESSMENT. - LIVES WITH, PROFILE
spouse comes to visit mainly in [place].  Daughter away at [facility] [place]  Son lives with pt. has some special needs and she has friends who assist him at times./children

## 2025-01-26 NOTE — DISCHARGE NOTE PROVIDER - NSDCCPCAREPLAN_GEN_ALL_CORE_FT
PRINCIPAL DISCHARGE DIAGNOSIS  Diagnosis: Symptomatic anemia  Assessment and Plan of Treatment: improved with 2 units prbc, no active bleeding follow up with gyn. hematology, GI this week      SECONDARY DISCHARGE DIAGNOSES  Diagnosis: Uterine fibroid  Assessment and Plan of Treatment:

## 2025-01-26 NOTE — CARE COORDINATION ASSESSMENT. - MET/SPOKE WITH
CM met with pt. and her emergency contact good friend Constantino Cassius [phone] at bedside with pt. permission for this assessment./patient/friend

## 2025-01-26 NOTE — CARE COORDINATION ASSESSMENT. - PRO ARRIVE FROM
Pt. called from PCP office to come to ED for anemia and blood transfusion.  CM met with pt. and her good friend Constantino at bedside introduced self and role of CM and provided discharge planning resource folder with CM contact information and pt. verbalized understanding.    pt. states she is independent / drives and drove herself here.  States she lives with her son 19y and her spouse mostly works in [place] , but is home now and daughter away at college. States she lives in a split level home with 4 steps top enter and 7 to kitchen and 5 to bedroom.    Declined having a caregiver independent with meds/ makes MD appointments  sometimes friend a Nurse Constantino goes with her.  Owns no DME.  Discussed home care services and pt. does not feel she would need home care.  Pharmacy is [facility]  PCP Dr. Sunny Gaston ([place]) [phone] [place]  [facility] ([place])  oncologist Dr. branden Jeong [phone]  Heme onc Kyler Wolf    Pt. verbalizing understanding of CM role and feels she will not need any services post acute and she was able to give good teach back of her PCP plans for follow up with a heme onc, GYN, GI for endoscopy/ colonoscopy post acute.    Pt. states her car is here and hopes to be able to drive herself home when cleared./home

## 2025-01-26 NOTE — DISCHARGE NOTE PROVIDER - NSDCFUSCHEDAPPT_GEN_ALL_CORE_FT
Zulma Molina  [facility]  [facility] [address]  Scheduled Appointment: 01/27/2025

## 2025-01-26 NOTE — H&P ADULT - ASSESSMENT
#acute blood loss anemia: likely due to known history of menorrhagia, but has some suspicion of possible GI bleed. She already received 2 units pRBC. Will get CBC now and see how she responded. Will also start on IV pantoprazole 40mg bid as well as IV iron sucrose 200mg. She does have outpatient appointment scheduled for OBGYN, hematology, GI. May need to consult as inpatient if there is further needs.    #chronic issues  -anxiety/depression/insomnia: resume home medication  -breast cancer: remission. Treated with tamoxifen    Code: FULL  Diet: regular  DVT ppx: SCDs  Dispo: admit to the hospital as inpatient

## 2025-01-26 NOTE — PATIENT PROFILE ADULT - FALL HARM RISK - UNIVERSAL INTERVENTIONS
Bed in lowest position, wheels locked, appropriate side rails in place/Call bell, personal items and telephone in reach/Instruct patient to call for assistance before getting out of bed or chair/Non-slip footwear when patient is out of bed/[place] to call system/Physically safe environment - no spills, clutter or unnecessary equipment/Purposeful Proactive Rounding/Room/bathroom lighting operational, light cord in reach

## 2025-01-26 NOTE — CARE COORDINATION ASSESSMENT. - NSDCPLANSERVICES_GEN_ALL_CORE
Per H&P  "49 year old female with PMHx of depression, anxiety, breast cancer, menorrhagia presents on 1/26 with fatigue and needing blood transfusion.    Known history of menorrhagia but never required blood transfusion. Her menstrual cycle is quite heavy and she needs to change pads every 30 minutes when it is severe. Goes on for about 2 weeks. She did have one BM that was charcoal in color some time back. Feels some GERD symptom as well. Breast cancer treated with tamoxifen, but off of it for 2 years. Since then, her bleeding has been more severe. Recently had blood test by PCP and recommended to go to ED for blood transfusion. She has appointment for OBGYN, hematology as well as GI. Supposed to get EGD/colonoscopy but was canceled due to anemia. Not on blood thinners or NSAIDs.    In the ED, Hb was in 5 range, 2 units given. Despite blood transfusion, she still felt weak, so ED called for admission. Case was discussed with ED provider.    #acute blood loss anemia: likely due to known history of menorrhagia, but has some suspicion of possible GI bleed. She already received 2 units pRBC. Will get CBC now and see how she responded. Will also start on IV pantoprazole 40mg bid as well as IV iron sucrose 200mg. She does have outpatient appointment scheduled for OBGYN, hematology, GI. May need to consult as inpatient if there is further needs."/Anticipated Needs Unclear at Present/No Anticipated Discharge Needs

## 2025-01-26 NOTE — DISCHARGE NOTE PROVIDER - HOSPITAL COURSE
49 year old female with PMHx of depression, anxiety, breast cancer, menorrhagia presents on 1/26 with fatigue and needing blood transfusion.    Known history of menorrhagia but never required blood transfusion. Her menstrual cycle is quite heavy and she needs to change pads every 30 minutes when it is severe. Goes on for about 2 weeks. Was admitted for severe anemia, hemoglobin improved appropriately with 2 units prbc, no active bleeding occurred during hospital stay, did have some pelvic pain which improved with medications. No longer having pain. Did have some pruritis with IV protonix, changed to pepcid BID. Patient wants to go home today, has outpatient appointments with her gynecologist, hematologist, GI this week for arranging endoscopy, colonoscopy. Can be discharged and follow up with her outpatient doctors.

## 2025-01-26 NOTE — DISCHARGE NOTE NURSING/CASE MANAGEMENT/SOCIAL WORK - FINANCIAL ASSISTANCE
[facility] Progress Note      Today's Date: 3/10/2023    PCP: Flaquita Naqvi MD  Referring Provider: No ref. provider found    INDICATION FOR CONSULTATION: Acute systolic heart failure      HPI:       Patient seen in ICU.    Patient remains somnolent.    No events overnight.    ROS:     Unable to be performed secondary to patient being somnolent      Relevant Past Medical History:  Past Medical History:   Diagnosis Date   • Congestive cardiac failure (CMD)    • COPD (chronic obstructive pulmonary disease) (CMD)    • Diabetes mellitus (CMD)    • Essential (primary) hypertension    • Uncomplicated senile dementia (CMD)       No past surgical history on file.     Social History:  Social History     Tobacco Use   Smoking Status Every Day   • Packs/day: 2.00   • Types: Cigarettes   Smokeless Tobacco Current     Social History     Substance and Sexual Activity   Alcohol Use Never     History   Drug Use Unknown       Family History:   No family history on file.    Inpatient Medications  Current Facility-Administered Medications   Medication Dose Route Frequency Provider Last Rate Last Admin   • metoPROLOL tartrate (LOPRESSOR) tablet 50 mg  50 mg Per NG Tube 2 times per day Hussein Ken MD   50 mg at 03/10/23 0936   • losartan (COZAAR) tablet 50 mg  50 mg Per NG Tube Daily Hussein Ken MD   50 mg at 03/10/23 0937   • nitroGLYCERIN topical (NITRO-BID) 2 % ointment 1 inch  1 inch Topical 4 times per day Hsusein Ken MD   1 inch at 03/10/23 0519   • Potassium Standard Replacement Protocol (Levels 3.5 and lower)   Does not apply See Admin Instructions Stu Lr MD       • Potassium Replacement (Levels 3.6 - 4)   Does not apply See Admin Instructions Stu Lr MD       • Phosphorus Standard Replacement Protocol   Does not apply See Admin Instructions Stu Lr MD       • Magnesium Standard Replacement Protocol   Does not apply See Admin Instructions Stu Lr MD       • atorvastatin  (LIPITOR) tablet 40 mg  40 mg Oral Nightly Hussein Ken MD   40 mg at 03/09/23 2101   • nicotine (NICODERM) 21 MG/24HR patch 1 patch  1 patch Transdermal Daily Nabil Augustin CNP   1 patch at 03/10/23 0937   • clopidogrel (PLAVIX) tablet 75 mg  75 mg Oral Daily Nabil Augustin CNP   75 mg at 03/10/23 0936   • sodium chloride (PF) 0.9 % injection 2 mL  2 mL Intracatheter 2 times per day Nabil Augustin CNP   2 mL at 03/10/23 0936   • insulin lispro (ADMELOG,HumaLOG) - Correction Dose   Subcutaneous 4 times per day Pan Consol, PA-C   2 Units at 03/10/23 0557   • insulin glargine (LANTUS) injection 10 Units  10 Units Subcutaneous Daily Pan Consol, PA-C   10 Units at 03/10/23 0936   • [Held by provider] isosorbide mononitrate (IMDUR) ER tablet 60 mg  60 mg Oral Daily Pan Consol, PA-C   60 mg at 03/03/23 0838   • [Held by provider] apixaBAN (ELIQUIS) tablet 5 mg  5 mg Oral 2 times per day Shahram Lozano MD   5 mg at 03/03/23 2200   • pantoprazole (PROTONIX INJECT) injection 40 mg  40 mg Intravenous Daily Nabil Augustin CNP   40 mg at 03/10/23 0936   • [Held by provider] losartan (COZAAR) tablet 100 mg  100 mg Oral Daily Shahram Lozano MD   100 mg at 03/03/23 1254   • empagliflozin (JARDIANCE) tablet 10 mg  10 mg Oral Daily Roland Miranda MD   10 mg at 03/10/23 0936      Current Facility-Administered Medications   Medication Dose Route Frequency Provider Last Rate Last Admin   • heparin (porcine) 25,000 units/250 mL in dextrose 5 % infusion  1-30 Units/kg/hr (Dosing Weight) Intravenous Continuous Hussein Ken MD   Held at 03/10/23 1129   • sodium chloride 0.9% infusion   Intravenous Continuous PRN Nabil Augustin CNP       • sodium chloride 0.9% infusion   Intravenous Continuous PRN Nabil J Batayeh, CNP          Current Facility-Administered Medications   Medication Dose Route Frequency Provider Last Rate Last Admin   • metoPROLOL (LOPRESSOR) injection 2.5 mg  2.5 mg Intravenous Q6H  PRN Heydi Patel MD       • ipratropium-albuterol (DUONEB) 0.5-2.5 (3) MG/3ML nebulizer solution 3 mL  3 mL Nebulization Q6H Resp PRN Kary Rojas MD       • heparin (porcine) injection 4,000 Units  4,000 Units Intravenous PRN Hussein Ken MD   4,000 Units at 03/07/23 0842   • heparin (porcine) injection 2,000 Units  2,000 Units Intravenous PRN Hussein Ken MD   2,000 Units at 03/10/23 0519   • sodium chloride 0.9 % flush bag 25 mL  25 mL Intravenous PRN Stu Lr MD       • sodium chloride 0.9 % flush bag 25 mL  25 mL Intravenous PRN Nabil Augustin CNP       • sodium chloride 0.9% infusion   Intravenous Continuous PRN Nabil Augustin CNP       • sodium chloride 0.9% infusion   Intravenous Continuous PRN Nabil Augustin CNP       • dextrose 50 % injection 25 g  25 g Intravenous PRN Pan Consol, PA-C       • dextrose 50 % injection 12.5 g  12.5 g Intravenous PRN Pan Consol, PA-C   12.5 g at 03/05/23 1731   • glucagon (GLUCAGEN) injection 1 mg  1 mg Intramuscular PRN Pan Consol, PA-C       • dextrose (GLUTOSE) 40 % gel 15 g  15 g Oral PRN Pan Consol, PA-C       • dextrose (GLUTOSE) 40 % gel 30 g  30 g Oral PRN Pan Consol, PA-C       • ondansetron (ZOFRAN) injection 4 mg  4 mg Intravenous Q6H PRN Pan Consol, PA-C   4 mg at 03/03/23 1234        Allergy   ALLERGIES:  No Known Allergies         Examination:      Vital Last Value 24 Hour Range   Temperature 97.2 °F (36.2 °C) (03/10/23 0800) Temp  Min: 97 °F (36.1 °C)  Max: 98.1 °F (36.7 °C)   Pulse (!) 112 (03/10/23 0800) Pulse  Min: 81  Max: 131   Respiratory (!) 31 (03/10/23 0800) Resp  Min: 21  Max: 49   Non-Invasive  Blood Pressure (!) 146/117 (03/10/23 0800) BP  Min: 123/96  Max: 187/118   Pulse Oximetry 98 % (03/10/23 0800) SpO2  Min: 91 %  Max: 99 %   Arterial   Blood Pressure   No data recorded         Intake/Output Summary (Last 24 hours) at 3/10/2023 1249  Last data filed at 3/10/2023 1100  Gross per 24 hour    Intake 888.81 ml   Output 2175 ml   Net -1286.19 ml        Weight    03/08/23 0700 03/09/23 0047 03/10/23 0049 03/10/23 0453   Weight: 87.6 kg (193 lb 2 oz) 89.5 kg (197 lb 5 oz) 88.8 kg (195 lb 12.3 oz) 85.2 kg (187 lb 13.3 oz)       General - somnulant, appears comfortable  HENT -normocephalic ,  mucosa  moist   Eyes - pupils equal, round, and reactive to light, normal conjunctiva  Neck - JVD not elevated, carotids normal upstroke with no bruit   Respiratory -good air exchange with few posterior crackles  Cardiovascular - IRRR, normal S1 and S2, soft HSM at LLSB & apex.  No edema of lower extremities  Gastrointestinal  - soft, nontender  Skin - warm, dry, no rash  Neurological  - somnulat   Cognition & Speech - speech garbled  Psychiatric - non-cooperative    Clinical Data:       The following were personally visualized and interpreted by me:    LABS:    CBC  Recent Labs   Lab 03/10/23  0333 03/09/23  0603 03/08/23  0344   WBC 10.4 8.0 7.8   HCT 39.6 37.6 38.7   HGB 12.3 11.7* 12.1    277 295       CMP  Recent Labs   Lab 03/10/23  0333 03/09/23  0603 03/08/23  0344   SODIUM 149* 149* 148*   POTASSIUM 4.6 4.0 3.9   CHLORIDE 109 110 111*   CO2 29 30 29   GLUCOSE 176* 153* 135*   BUN 38* 32* 25*   CREATININE 1.68* 1.74* 1.57*   CALCIUM 9.8 9.6 9.3   TOTPROTEIN 6.6 6.1* 6.4   ALBUMIN 3.1* 3.0* 3.1*   BILIRUBIN 0.4 0.4 0.4   AST 34 37 56*   GPT 80* 85* 108*   ALKPT 119* 103 101       Cardiac Labs  Recent Labs   Lab 03/08/23  0344   NTPROB 16,378*       Lipid Panel  Recent Labs   Lab 03/07/23  0917   CHOLESTEROL 129   HDL 36*   CALCLDL 79   TRIGLYCERIDE 68       Coags  Recent Labs   Lab 03/10/23  1052 03/10/23  0333 03/09/23  2104 03/06/23  1847 03/06/23  0839 03/06/23  0036 03/05/23  1256   INR  --   --   --   --  1.2  --  1.3   * 38* 45*   < > 41*   < > 28    < > = values in this interval not displayed.       AB  Recent Labs   Lab 03/05/23  0716 03/04/23  2330 03/04/23  1528   RAPH 7.39 7.42 7.38    RAPCO2 44 37 40   RAPO2 127* 112* 113*   RAHCO3 27 24 24   RASAT 99 99 99       IMAGING:    ECG:   Encounter Date: 23   Electrocardiogram 12-Lead   Result Value    Ventricular Rate EKG/Min (BPM) 133    Atrial Rate (BPM) 258    CA-Interval (MSEC) 126    QRS-Interval (MSEC) 86    QT-Interval (MSEC) 300    QTc 446    P Axis (Degrees) -87    R Axis (Degrees) 123    T Axis (Degrees) -33    REPORT TEXT      Atrial flutter  with variable AV block  Left posterior fascicular block  Marked ST abnormality, possible inferior subendocardial injury  Abnormal ECG  When compared with ECG of  2023 12:45,  ST now depressed in  Inferior leads  Nonspecific T wave abnormality no longer evident in  Anterior leads  Confirmed by JLUIS GODOY, [facility] ([id number]) on 3/3/2023 1:18:32 PM          Echocardiogram:  Results for orders placed during the hospital encounter of 23    TRANSTHORACIC ECHO (TTE) COMPLETE W/ W/O IMAGING AGENT    Narrative  *[facility]*  [id number] [place] [address]  Transthoracic Echocardiogram (TTE)    Patient: Robbie Beauchamp     Study Date/Time:        Mar 3 2023 8:06AM  MRN:     [id number]             FIN#:                   [id number]  :     1946          Ht/Wt:                  175cm 98.9kg  Age:     77                  BSA/BMI:                2.22m^2 32.3kg/m^2  Gender:  F                   Baseline BP:            170 / 110  Ordering Physician:      Nabil Augustin    Referring Physician:     Nabil Augustin    Attending Physician:     Shahram Lozano  Performing Physician:    [facility]  Diagnostic Physician:    Roland Miranda MD  Sonographer:             Karishma Osman    ------------------------------------------------------------------------------  INDICATIONS:   HF r EF.    ------------------------------------------------------------------------------  STUDY CONCLUSIONS  SUMMARY:    1. Left ventricle: The cavity size is normal. Wall thickness is  mildly  increased. There is concentric hypertrophy. Systolic function is severely  reduced. The ejection fraction was measured by biplane method of disks.  There is moderate diffuse hypokinesis. Hypokinesis of the entire wall. The  ejection fraction is 27%.  2. Mitral valve: There is moderate to severe regurgitation.  3. Left atrium: The atrium is moderately dilated.  4. Right ventricle: The cavity size is mildly dilated. Wall thickness is  normal. Systolic function is reduced. Systolic pressure is mildly to  moderately increased. The estimated peak pressure is 47mm Hg.  5. Right atrium: The atrium is moderately dilated.  6. Tricuspid valve: There is moderate regurgitation.    ------------------------------------------------------------------------------  STUDY DATA:   Procedure:  A transthoracic echocardiogram was performed. Image  quality was adequate. The study was technically limited due to body habitus.  M-mode, complete 2D, complete spectral Doppler, and color Doppler.  Study  status:  Routine.  Study completion:  There were no complications.    FINDINGS    LEFT VENTRICLE:  The cavity size is normal. Wall thickness is mildly  increased. There is concentric hypertrophy. Systolic function is severely  reduced. There is moderate diffuse hypokinesis.    The ejection fraction was  measured by biplane method of disks. The ejection fraction is 27%.  REGIONAL  WALL MOTION ABNORMALITIES:   Hypokinesis of the entire wall. Cannot assess LV  diastolic function.    AORTIC VALVE:  The annulus is normal. The valve is trileaflet. The leaflets  are mildly thickened. Cusp separation is normal. Velocity is within the normal  range. There is no stenosis. There is no regurgitation.    AORTA:  Ascending aorta: The vessel is normal-sized.    MITRAL VALVE:  The annulus is normal. The leaflets are mildly thickened.  Leaflet separation is normal. Inflow velocity is increased. There is no  evidence for stenosis. There is moderate to  severe regurgitation.    LEFT ATRIUM:  The atrium is moderately dilated.    RIGHT VENTRICLE:  The cavity size is mildly dilated. Wall thickness is normal.  Systolic function is reduced.  Systolic pressure is mildly to moderately  increased. The estimated peak pressure is 47mm Hg.       The RV pressure  during systole is 8mm Hg.    PULMONIC VALVE:   Not well visualized. There is mild regurgitation.    TRICUSPID VALVE:  The valve is structurally normal. Leaflet separation is  normal. Inflow velocity is within the normal range. There is no evidence for  stenosis. There is moderate regurgitation.    RIGHT ATRIUM:  The atrium is moderately dilated.    PERICARDIUM:   There is no pericardial effusion.    SYSTEMIC VEINS:  Inferior vena cava: The IVC is dilated.  Respirophasic diameter changes are  blunted (< 50%).    ------------------------------------------------------------------------------  Measurements    Left ventricle         Value        Ref       01/31/2023  Right ventricle          Value          Ref       01/31/2023  KATI, LAX chord     (N) 4.7   cm     3.8 - 5.2 3.8         KATI, LAX                 3.0   cm       --------- 3.2  ESD, LAX chord     (H) 4.5   cm     2.2 - 3.5 2.7         Pressure, S              8     mm Hg    --------- 62  KATI/bsa, LAX chord (L) 2.1   cm/m^2 2.3 - 3.1 1.9  ESD/bsa, LAX chord (N) 2.0   cm/m^2 1.3 - 2.1 1.3         Left atrium              Value          Ref       01/31/2023  PW, ED, LAX        (H) 1.3   cm     0.6 - 0.9 1.3         AP dim, ES           (H) 4.5   cm       2.7 - 3.8 ----------  IVS, ED            (H) 1.4   cm     0.6 - 0.9 1.3         AP dim index, ES     (N) 2.0   cm/m^2   1.5 - 2.3 ----------  PW, ED             (H) 1.3   cm     0.6 - 0.9 1.3         Area ES, A4C         (H) 32    cm^2     <=20      ----------  IVS/PW, ED             1.07         --------- 1           Area/bsa ES, A4C         14.55 cm^2/m^2 --------- ----------  EDV                (N) 103   ml      46 - 106  64          Vol, ES, 1-p A4C     (H) 108   ml       22 - 52   ----------  ESV                (H) 91    ml     14 - 42   26          Vol/bsa, ES, 1-p A4C (H) 49    ml/m^2   11 - 40   ----------  EF                 (L) 27    %      54 - 74   45  SV                     12    ml     --------- 38          Mitral valve             Value          Ref       01/31/2023  EDV/bsa            (N) 46    ml/m^2 29 - 61   31          MR peak v                5.75  m/sec    --------- ----------  ESV/bsa            (H) 41    ml/m^2 8 - 24    13          Peak LV-LA grad S        132   mm Hg    --------- ----------  SV/bsa                 5     ml/m^2 --------- 18  ESV, 1-p A4C       (N) 56    ml     12 - 60   25          Tricuspid valve          Value          Ref       01/31/2023  SV, 1-p A4C            29    ml     --------- 8           TR peak v            (H) 3.1   m/sec    <=2.8     3.7  ESV/bsa, 1-p A4C   (N) 25    ml/m^2 7 - 35    12          Peak RV-RA grad, S       39    mm Hg    --------- 54  SV/bsa, 1-p A4C        13    ml/m^2 --------- 4  EDV, 2-p           (N) 87    ml     46 - 106  41          Aortic root              Value          Ref       01/31/2023  ESV, 2-p           (H) 63    ml     14 - 42   21          Root diam, ED        (N) 3.3   cm       2.8 - 4.3 3.1  EDV/bsa, 2-p       (N) 39    ml/m^2 29 - 61   20  ESV/bsa, 2-p       (H) 29    ml/m^2 8 - 24    10          Pulmonary artery         Value          Ref       01/31/2023  Pressure, S              8     mm Hg    --------- ----------  Legend:  (L)  and  (H)  lacy values outside specified reference range.    (N)  marks values inside specified reference range.    Prepared and electronically signed by  Roland Miranda MD  03/03/2023 12:34      Cath Report:  Results for orders placed or performed during the hospital encounter of 07/12/22   Cath/PV Case    Narrative    · Prox RCA to Dist RCA lesion with 90% stenosis s/p successful PCI using   SARA x1  · Prox Cx  lesion with 30% stenosis.  · Prox LAD lesion with 50% stenosis.  · 1st Mrg lesion with 30% stenosis.  · Normal LVEDP  · Small right radial access     CARDIAC CATHETERIZATION REPORT      CARDIAC CATH INDICATION: NSTEMI  BRIEF MEDICAL HISTORY:  76 year old female w/ PMHx of CAD status post PCI   to the LAD who came with chest pain elevated troponin    PROCEDURES PERFORMED:    1. Access: Right common femoral access  2. Successful PCI to the RCA using SARA x3  3. IVUS  2.  Coronary angiography  3.  Left heart cath    PROCEDURE IN DETAILS:    The risks and alternatives of the procedures and conscious sedation were   explained to the patient and informed consent was obtained. The patient   agree to proceed with procedure. The patient brought to catheterization   lab in stable condition.   Right groin was draped and prepped in sterile   fashion the area of the vascular access in the right common femoral was   incised using lidocaine and then using modified Seldinger technique via   singlewall cannulation micropuncture access right common femoral artery   angiogram was performed suitable anatomy upsized to 6 Maori sheath and   then selective left and right heart catheterization was performed using JL   4 and JR4 catheter then JR4 catheter was used to cross aortic valve   measured LVEDP and pullback to take if there is any gradient.  Standard   views for coronaries were taken.  At this time decision was made to   proceed with intervention to the RCA      INTERVENTION INDICATION: NSTEMI  INTERVENTIONAL TECHNIQUE:    -The culprit vessel: RCA  -DAWSON flow before: 3  -DAWSON flow after: 3  -Residual stenosis: 0%    A weight-based bolus dose of IV unfractionated heparin was given to   achieve and maintain an ACT greater than 250 seconds for the duration of   the procedure. ACT was checked to make sure therapeutic level before PCI   started.    JR4 guide was advanced over wire and used to engage the RCA  Support with guide extension  due to heavily calcified vessel   Prowater and run-through wire was advanced into the distal PDA and PLV    Predilatation was performed using 3 mm NC balloon serial inflation was   performed    Then using guide extension support we advanced 3 stents and deployed in   overlapping fashion    Then we performed IVUS for evaluation of the stents and postdilated the   stents to high-pressure 3.5 mm balloon up to 22 lele with excellent   expansion    Final angiography confirmed DAWSON-3 flow without dissection, distal   embolization or perforation. The patient received aspirin 325 mg and   Plavix 600 mg PO in the cath lab.     The procedure was concluded, everything was removed over a wire. Patent   Hemostasis of the right common femoral artery using Mynx due to heavily   calcified vessel he left without any precordial pain. He was transferred   to the recovery area in a stable condetion to be monitored. Discussed the   findings with the patient and his family.       BRIEF HEMODYNAMICS:      LVEDP 13 normal      CONCLUSION:   • Ostial LAD 50 to 60% stenosis focal, mid widely patent stent  • RCA heavily calcified diffusely diseased treated with IVUS guided SARA x3  • LVEDP: 13    PLAN:  • Dual antiplatelet therapy with aspirin and Plavix for at least the next   1 year.  • Post PCI EKG and hydration for 4 hours  • Cardiac rehab evaluation level 2 followed by level 3, and if he is   pain-free on the unit, he will be appropriate for discharge.  • LDL goal 55-70 and A1C goal < 7, Heart healthy diet and exercise   • Follow up with cardiology as outpatient in 1-2 weeks.      Jackson Gomez MD [facility] Interventional Cardiologist           Assessment/Plans:     1.  Acute CVA  2.  Acute on chronic systolic heart failure  3.  CAD   -s/p PCI/RCA in 7/22  4.  Hypertension  5.  Hyperlipidemia  6.  Chronic renal insufficiency  7.  Elevated troponin  8.  Dementia      Patient remains somnolent and nonverbal.  She appears back  [facility] provides services at a reduced cost to those who are determined to be eligible through [facility]’s financial assistance program. Information regarding [facility]’s financial assistance program can be found by going to [email or web address] or by calling [phone]. on exam.    Telemetry which I personally reviewed shows atrial fibrillation with heart rates averaging in the 80s and 90s.    Laboratory data significant for creatinine of 1.6, potassium 4.6, hemoglobin of 12.  Peak troponin was 604     MRI done 3/6/2023 significant for bilateral anterior thalamic infarcts    Echocardiogram on 3/3/2023 shows an EF of 27% with moderate to severe mitral regurgitation and a PA pressure 47 mmHg.      Recommend to restart Eliquis 5 mg twice daily.  We will hold heparin.    Continue current dose of Plavix, atorvastatin, metoprolol and losartan.    Etiology of elevated troponin is unclear at this time.  It does not appear to be consistent with acute coronary syndrome.  In light of CVA and altered mental status, I would not recommend any ischemic evaluation at this time.    Family defers RUBY at this time    We will sign off for now; please call if we can be of any further assistance.      D/W nursing staff.         Thank you for allowing us to participate in this patient's care.  Please do not hesitate to call with any questions or concerns.    Hussein Ken MD  [facility]       The above note was created using dictation using voice recognition software.  While every effort was made to correct the dictation, an occasional error may have been missed.

## 2025-01-26 NOTE — H&P ADULT - HISTORY OF PRESENT ILLNESS
49-year-old female with history of panic attacks, insomnia, depression, anxiety, breast cancer, and menorrhagia presents to emergency room with low hemoglobin and need for transfusion.  Patient does have a longstanding history of heavy menstrual cycles and fibroids.  Patient had fibroids removed about 4 to 5 years ago twice.  Has been seen by OB/GYN but was told fibroids are not as large as before and will likely shrink around menopause.  Patient states menstrual cycles have been getting heavier and longer.  States menstrual cycles will last for about 2 weeks.  Last menstrual cycle January 1 and lasted for 2 weeks.  No current bleeding.  Patient has had some generalized fatigue, weakness, shortness of breath with exertion the past few months.  Was seen at this emergency room for similar symptoms 2 to 3 months ago.  States at that time hemoglobin was 9.6 and discharged home.  Symptoms have been progressively getting worse.  Continues to feel lightheaded, weakness, and shortness of breath with exertion.  Will occ  feel dizzy.  Mild intermittent headaches.  States has been sleeping a lot more than usual.  Was seen by primary care doctor a couple days ago.  Was referred to hematology to set up iron infusions.  Was also set up with GI for endoscopy and colonoscopy due to increased acid and some lower abdominal discomfort past few months.  Was called and told hemoglobin was 5.5 and to go to emergency room for transfusion.  States endoscopy and colonoscopy was canceled due to low hemoglobin.  Denies history of iron transfusion or blood transfusion in the past.  No chest pain  	PCP Dr. Sunny Gaston ([place])  OB-GYN [place] ([place]) 49 year old female with PMHx of depression, anxiety, breast cancer, menorrhagia presents on 1/26 with fatigue and needing blood transfusion.    Known history of menorrhagia but never required blood transfusion. Her menstrual cycle is quite heavy and she needs to change pads every 30 minutes when it is severe. Goes on for about 2 weeks. She did have one BM that was charcoal in color some time back. Feels some GERD symptom as well. Breast cancer treated with tamoxifen, but off of it for 2 years. Since then, her bleeding has been more severe. Recently had blood test by PCP and recommended to go to ED for blood transfusion. She has appointment for OBGYN, hematology as well as GI. Supposed to get EGD/colonoscopy but was canceled due to anemia. Not on blood thinners or NSAIDs.    In the ED, Hb was in 5 range, 2 units given. Despite blood transfusion, she still felt weak, so ED called for admission. Case was discussed with ED provider.    Time of my evaluation, she is tired and wants to sleep and requesting her insomnia medication.     to emergency room with low hemoglobin and need for transfusion.  Patient does have a longstanding history of heavy menstrual cycles and fibroids.  Patient had fibroids removed about 4 to 5 years ago twice.  Has been seen by OB/GYN but was told fibroids are not as large as before and will likely shrink around menopause.  Patient states menstrual cycles have been getting heavier and longer.  States menstrual cycles will last for about 2 weeks.  Last menstrual cycle January 1 and lasted for 2 weeks.  No current bleeding.  Patient has had some generalized fatigue, weakness, shortness of breath with exertion the past few months.  Was seen at this emergency room for similar symptoms 2 to 3 months ago.  States at that time hemoglobin was 9.6 and discharged home.  Symptoms have been progressively getting worse.  Continues to feel lightheaded, weakness, and shortness of breath with exertion.  Will occ  feel dizzy.  Mild intermittent headaches.  States has been sleeping a lot more than usual.  Was seen by primary care doctor a couple days ago.  Was referred to hematology to set up iron infusions.  Was also set up with GI for endoscopy and colonoscopy due to increased acid and some lower abdominal discomfort past few months.  Was called and told hemoglobin was 5.5 and to go to emergency room for transfusion.  States endoscopy and colonoscopy was canceled due to low hemoglobin.  Denies history of iron transfusion or blood transfusion in the past.  No chest pain  	PCP Dr. Sunny Gaston ([place])  OB-GYN [place] ([place])

## 2025-01-26 NOTE — DISCHARGE NOTE PROVIDER - NSDCMRMEDTOKEN_GEN_ALL_CORE_FT
acetaminophen 325 mg oral tablet: 2 tab(s) orally every 6 hours As needed Temp greater or equal to 38C (100.4F), Mild Pain (1 - 3)  famotidine 40 mg oral tablet: 1 tab(s) orally 2 times a day  Lexapro 20 mg oral tablet: 1 tab(s) orally once a day  zolpidem 10 mg oral tablet: 1 tab(s) orally once a day (at bedtime)

## 2025-01-26 NOTE — H&P ADULT - NSCORESITESY/N_GEN_A_CORE_RD
From: Dany Melgar  To: Calixto Quintanilla  Sent: 12/29/2022 1:43 PM CST  Subject: Pet scan request    Giovanny Quintanilla,  I spoke with your nurse Karen (not sure of spelling) and he gave me your message that the PET scan you were referring to was in Oct 2019 at [facility].    Following up from our appointment, my dad is not feeling that a biopsy is the best course of action. He doesn’t like the idea of poking at the nodule.    We request to take another PET scan as the next step to see if any changes are noted there.    Can you order that for him or would you like to discuss this first?    Thank you so much,   Terra   Great. Should I enter a referral order to you? Not sure how it works. Or do you want to call and talk to his daughter Terra? I called and discussed with daughter Terra. I answered additional questions they had about the terminology in the PET/CT report. I let Terra know that I discussed with Dr. Grande. He and Dr. Preston reviewed imaging and think that the best approach with be with navigational bronchoscopy. Their office reached out to her to schedule an appointment. I have also ordered a pulmonary function test.  I called daughter Terra (patient Dany previously gave me consent do discuss with Terra). We discussed PET/CT in detail. We compared to 2019 PET/CT which was done at [facility]. We discussed spiculated border and high likelihood that this is lung cancer. I again recommended biopsy as I have in the past. Terra and I spent some time discussing her dad's concerns and hesitations. He has had close friends die due to complications of cancer and this news is very hard for him. He is not sure that he would want to go through a difficult treatment. We previously discussed that it would be a good idea from Dany to have a goals of care discussion with either his PCP or with Palliative Care. They have not yet done that. We decided that in the meantime it would be in patient's best interest to get as much information as possible about potential biopsy (Interventional Pulmonary vs IR lung biopsy).   No Rn relay the message to daughter Terra, Gave number for Central Scheduling.    There have been scheduling delays with the radiology department, so this test may not be scheduled for 1-2 months which is more of a delay than I would like. As long as they understand that this is not my recommendation (I recommend IR lung biopsy as I did in 2021 as well), it is certainly up to the patient to pursue PET/CT over lung biopsy as long as he understands that there can be a delay in diagnosis.     Lavon can you please discuss? I will order PET/CT now. WDL

## 2025-01-26 NOTE — CARE COORDINATION ASSESSMENT. - NSCAREPROVIDERS_GEN_ALL_CORE_FT
CARE PROVIDERS:  Accepting Physician: Ham Macias  Admitting: Ham Macias  Attending: Desmond Barakat  Case Management: Ann Marie Farias  Covering Team: Igor Lenz  ED ACP: Kassy Del Rosario  ED Attending: Desmond Tovar  ED Attending2: Yousuf Kaba  ED Nurse: Isabell Milton  Nurse: Angelita Rodrigues  Nurse: Bradley Maldonado  Nurse: Otto Morley  Nurse: Paola Davis  Ordered: ServiceAccount, SCMMLM  Override: Gayatri Rodriguessia  PCA/Nursing Assistant: Heena Lyons  Primary Team: Desmond Barakat  Primary Team: Ham Macias  Quality Review: Constantino Hammonds  Registered Dietitian: Milena Chacon  Respiratory Therapy: Candi Aldridge  Respiratory Therapy: Silvia Vitale  Team: [facility]  Hospitalists, Team  UR// Supp. Assoc.: Audra Muniz

## 2025-01-26 NOTE — H&P ADULT - NSHPREVIEWOFSYSTEMS_GEN_ALL_CORE
REVIEW OF SYSTEMS:  CONSTITUTIONAL: No fever, weight loss, or fatigue. Has weakness.  EYES: No eye pain, visual disturbances, or discharge  ENMT:  No difficulty hearing, tinnitus, vertigo; No sinus or throat pain  NECK: No pain or stiffness  BREASTS: No pain, masses, or nipple discharge  RESPIRATORY: No cough, wheezing, chills or hemoptysis; No shortness of breath  CARDIOVASCULAR: No chest pain, palpitations, dizziness, or leg swelling  GASTROINTESTINAL: No abdominal or epigastric pain. No nausea, vomiting, or hematemesis; No diarrhea or constipation. No melena or hematochezia.  GENITOURINARY: No dysuria, frequency, hematuria, or incontinence  NEUROLOGICAL: No headaches, memory loss, loss of strength, numbness, or tremors  SKIN: No itching, burning, rashes, or lesions   LYMPH NODES: No enlarged glands  ENDOCRINE: No heat or cold intolerance; No hair loss; No polydipsia or polyuria  MUSCULOSKELETAL: No joint pain or swelling; No muscle, back, or extremity pain  PSYCHIATRIC: No depression, anxiety, mood swings, or difficulty sleeping  HEME/LYMPH: No easy bruising, or bleeding gums  ALLERGY AND IMMUNOLOGIC: No hives or eczema

## 2025-01-26 NOTE — DISCHARGE NOTE PROVIDER - ATTENDING DISCHARGE PHYSICAL EXAMINATION:
Vital Signs Last 24 Hrs  T(C): 36.5 (26 Jan 2025 14:54), Max: 37.1 (25 Jan 2025 23:09)  T(F): 97.7 (26 Jan 2025 14:54), Max: 98.8 (25 Jan 2025 23:09)  HR: 63 (26 Jan 2025 14:54) (63 - 78)  BP: 81/54 (26 Jan 2025 14:54) (81/54 - 115/70)  BP(mean): 76 (25 Jan 2025 22:40) (76 - 76)  RR: 16 (26 Jan 2025 14:54) (15 - 20)  SpO2: 98% (26 Jan 2025 14:54) (98% - 100%)    Parameters below as of 26 Jan 2025 14:54  Patient On (Oxygen Delivery Method): room air      CONSTITUTIONAL: Well-developed; well-nourished; in no acute distress.  SKIN: Skin exam is warm and dry, no acute rash.  HEAD: Normocephalic; atraumatic.  EYES: PERRL, EOM intact; conjunctiva and sclera clear.  ENT: No nasal discharge; airway clear. TMs clear.  NECK: Supple; non tender.  CARD: S1, S2 normal; no murmurs, gallops, or rubs. Regular rate and rhythm.  RESP: No wheezes, rales or rhonchi.  ABD: Normal bowel sounds; soft; non-distended;  mild bilateral LQ tenderness without guarding, no rebound, no hepatosplenomegaly.  EXT: Normal ROM. No clubbing, cyanosis or edema.  LYMPH: No acute cervical adenopathy.  NEURO: Alert, oriented. Grossly unremarkable. No focal deficits.  PSYCH: Cooperative, appropriate.

## 2025-01-26 NOTE — CHART NOTE - NSCHARTNOTEFT_GEN_A_CORE
Patient seen and examined in am, complaining of itching after receiving protonix, pelvic cramping, does feel some urge to defecate, has not, denied black stools, instead stated had been pale. has not been taking oral iron supplements for over 1 month. expected time for next menstruation is in next week. Abdominal exam with + BS, soft, no point tenderness noted, no rebound. lung sounds clear. labs noted.     #acute blood loss anemia: likely due to known history of menorrhagia, cramping from fibroids  - stopping protonix given itching. received benadryl 50 mg q6h prn for itching  - changed to pepcid 40 bid   She already received 2 units pRBC.  increased appropriately  - no active bleeding noted  IV iron sucrose ordered  - percocet prn for pain  - bowel regimen    #chronic issues  -anxiety/depression/insomnia: resume home medication  -breast cancer: remission. Treated with tamoxifen    Code: FULL  Diet: regular  DVT ppx: SCDs

## 2025-01-26 NOTE — H&P ADULT - NSHPPHYSICALEXAM_GEN_ALL_CORE
PHYSICAL EXAM:  Vital Signs Last 24 Hrs  T(C): 37.1 (26 Jan 2025 01:05), Max: 37.1 (25 Jan 2025 23:09)  T(F): 98.8 (26 Jan 2025 01:05), Max: 98.8 (25 Jan 2025 23:09)  HR: 69 (26 Jan 2025 01:30) (63 - 78)  BP: 100/58 (26 Jan 2025 01:30) (92/61 - 112/78)  BP(mean): 76 (25 Jan 2025 22:40) (76 - 76)  RR: 15 (26 Jan 2025 01:30) (15 - 20)  SpO2: 98% (26 Jan 2025 01:30) (98% - 100%)    Parameters below as of 26 Jan 2025 01:30  Patient On (Oxygen Delivery Method): room air        GENERAL: NAD, well-groomed, well-developed  HEAD:  Atraumatic, Normocephalic  EYES: EOMI, PERRLA, conjunctiva and sclera clear  ENMT: No tonsillar erythema, exudates, or enlargement; Moist mucous membranes, Good dentition, No lesions  NECK: Supple, No JVD, Normal thyroid  NERVOUS SYSTEM:  Alert & Oriented X3, Good concentration; Motor Strength 5/5 B/L upper and lower extremities;  CHEST/LUNG: Clear to auscultation bilaterally; No rales, rhonchi, wheezing, or rubs  HEART: Regular rate and rhythm; No murmurs, rubs, or gallops  ABDOMEN: Soft, Nontender, Nondistended; Bowel sounds present  EXTREMITIES:  2+ Peripheral Pulses, No clubbing, cyanosis, or edema  LYMPH: No lymphadenopathy noted  SKIN: No rashes or lesions

## 2025-01-26 NOTE — CARE COORDINATION ASSESSMENT. - QUALITY OF FAMILY RELATIONSHIPS
Friend supportive and involved Constantino alan works at [employer] ED nurse / spouse in [place] and with son./supportive/involved

## 2025-01-26 NOTE — DISCHARGE NOTE PROVIDER - CARE PROVIDER_API CALL
Zulma Molina  Obstetrics and Gynecology  [address]  [place] [address]  Phone: [phone]  Fax: [phone]  Established Patient  Follow Up Time: 1 week

## 2025-01-26 NOTE — DISCHARGE NOTE PROVIDER - NSCORESITESY/N_GEN_A_CORE_RD
Orthotics arrived back in office. Left detailed message on patient's voicemail to inform to schedule OV1 double book w/ [facility]. Patient needs to sign billing consent form & deposit of $100. We will be in [place] on 12/14 if patient wants to schedule appt to come pick them up then.  
Patient was scheduled.   
No

## 2025-01-26 NOTE — DISCHARGE NOTE NURSING/CASE MANAGEMENT/SOCIAL WORK - PATIENT PORTAL LINK FT
You can access the FollowMyHealth Patient Portal offered by [facility] by registering at the following website: [email or web address]. By joining [employer]’s FollowMyHealth portal, you will also be able to view your health information using other applications (apps) compatible with our system.

## 2025-01-26 NOTE — CARE COORDINATION ASSESSMENT. - NSPASTMEDSURGHISTORY_GEN_ALL_CORE_FT
PAST MEDICAL & SURGICAL HISTORY:  Panic attack      Insomnia      Depression      Anxiety      Breast cancer      Menorrhagia

## 2025-01-27 ENCOUNTER — APPOINTMENT (OUTPATIENT)
Dept: OBGYN | Facility: CLINIC | Age: 50
End: 2025-01-27
Payer: MEDICAID

## 2025-01-27 VITALS
SYSTOLIC BLOOD PRESSURE: 107 MMHG | BODY MASS INDEX: 19.66 KG/M2 | HEIGHT: 65 IN | OXYGEN SATURATION: 98 % | DIASTOLIC BLOOD PRESSURE: 71 MMHG | WEIGHT: 118 LBS | TEMPERATURE: 98.1 F | HEART RATE: 74 BPM | RESPIRATION RATE: 16 BRPM

## 2025-01-27 DIAGNOSIS — Z78.9 OTHER SPECIFIED HEALTH STATUS: ICD-10-CM

## 2025-01-27 DIAGNOSIS — Z85.3 PERSONAL HISTORY OF MALIGNANT NEOPLASM OF BREAST: ICD-10-CM

## 2025-01-27 DIAGNOSIS — Z01.419 ENCOUNTER FOR GYNECOLOGICAL EXAMINATION (GENERAL) (ROUTINE) W/OUT ABNORMAL FINDINGS: ICD-10-CM

## 2025-01-27 DIAGNOSIS — F32.A ANXIETY DISORDER, UNSPECIFIED: ICD-10-CM

## 2025-01-27 DIAGNOSIS — F41.9 ANXIETY DISORDER, UNSPECIFIED: ICD-10-CM

## 2025-01-27 DIAGNOSIS — N93.9 ABNORMAL UTERINE AND VAGINAL BLEEDING, UNSPECIFIED: ICD-10-CM

## 2025-01-27 PROBLEM — Z00.00 ENCOUNTER FOR PREVENTIVE HEALTH EXAMINATION: Status: ACTIVE | Noted: 2025-01-27

## 2025-01-27 PROCEDURE — 99203 OFFICE O/P NEW LOW 30 MIN: CPT

## 2025-01-27 PROCEDURE — 99459 PELVIC EXAMINATION: CPT

## 2025-01-27 RX ORDER — ESCITALOPRAM OXALATE 20 MG/1
20 TABLET, FILM COATED ORAL
Refills: 0 | Status: ACTIVE | COMMUNITY

## 2025-01-27 RX ORDER — TRAZODONE HYDROCHLORIDE 50 MG/1
50 TABLET ORAL
Refills: 0 | Status: ACTIVE | COMMUNITY

## 2025-01-27 RX ORDER — TRANEXAMIC ACID 650 MG/1
650 TABLET ORAL EVERY 8 HOURS
Qty: 30 | Refills: 0 | Status: ACTIVE | COMMUNITY
Start: 2025-01-27 | End: 1900-01-01

## 2025-01-27 RX ORDER — ALPRAZOLAM 1 MG/1
1 TABLET ORAL
Refills: 0 | Status: ACTIVE | COMMUNITY

## 2025-01-27 RX ORDER — ZOLPIDEM TARTRATE 5 MG/1
TABLET, FILM COATED ORAL
Refills: 0 | Status: ACTIVE | COMMUNITY

## 2025-01-28 ENCOUNTER — TRANSCRIPTION ENCOUNTER (OUTPATIENT)
Age: 50
End: 2025-01-28

## 2025-01-28 NOTE — PHYSICAL EXAM
[Chaperone Present] : A chaperone was present in the examining room during all aspects of the physical examination [32279] : A chaperone was present during the pelvic exam. [Appropriately responsive] : appropriately responsive [Alert] : alert [No Acute Distress] : no acute distress [Soft] : soft [Non-tender] : non-tender [Non-distended] : non-distended [No HSM] : No HSM [No Lesions] : no lesions [No Mass] : no mass [Oriented x3] : oriented x3 [No Discharge] : no discharge [The Right Breast Was Examined] : a normal appearance [The Left Breast Was Examined] : a normal appearance [___] : a [unfilled] ~Ucm mastectomy scar [No Masses] : no breast masses were palpable [Labia Majora] : normal [Labia Minora] : normal [No Bleeding] : There was no active vaginal bleeding [Normal] : normal [Enlarged ___ wks] : enlarged [unfilled] ~Uweeks [Uterine Adnexae] : normal [FreeTextEntry2] : MURPHY Brownlee [FreeTextEntry6] : fibroid uterus; feels mobile

## 2025-01-28 NOTE — PLAN
[FreeTextEntry1] : 48yo with AUB likely 2/2 to fibroids. Hx of breast cancer s/p tamoxifen use x2 years, discontinued last year We discussed that AUB very likely due to fibroids, however need to rule out malignancy dustin in setting of tamoxifen use. Discussed mgmt incl medical options such as GnRH antagonists/agonists and progesterone IUD. Discussed that hysterectomy is definitive mgmt as other methods there is a chance of failure. #HCM - pap/HPV obtained today, will f/u results - continue HCM per PCP #AUB - will book D&C, patient to get medical clearance from PCP - record release from [facility] for breast ca, would like to know what type she had - rx tranexamic acid for acute bleeding - agree w heme and iron infusions - bleeding precautions, if heavy bleeding again present to ED - will call pt once booking complete

## 2025-01-28 NOTE — HISTORY OF PRESENT ILLNESS
Are you able to work this patient into your schedule today per request?    [Patient reported mammogram was normal] : Patient reported mammogram was normal [Patient reported PAP Smear was normal] : Patient reported PAP Smear was normal [FreeTextEntry1] : 48yo referred by PCP Dr. Arambula for AUB. Hx of breast cancer stage 0, s/p left lumpectomy in  s/p RT, was started on tamoxifen. States has had heavy periods for the past few years, but after starting tamoxifen periods became lighter. Periods were more painful/crampier on tamoxifen, so about a year ago stopped taking. [facility] oncologist told her can discontinue if does not like side effects. Since discontinuing tamoxifen, periods have returned to being heavy.  Has a hx of fibroids, s/p myomectomy for 8cm fibroid 7 years ago in [facility]. When the heavy bleeding resumed a year ago, went to another GYN in [place], had a D&C under anesthesia, was told pathology benign and fibroids weren't that large so should not be an issue.  Periods continue to be heavy, has not been on any medications due to history of cancer, was told no estrogen/progesterone. Sent to ED over the weekend for anemia, h/h was 5.7/22.7. Received 2u pRBC w improvement in h/h to . Also had pelvic sono in ED which showed fibroid uterus and thickened endometrial lining.  LMP was 25; gets periods [id number], bleeds for 2 wks during period.  Lately has had loss of appetite, fatigue, heartburn and stomach issues. States cannot take NSAIDs due to stomach sensitivities. Going to see heme for Fe infusions.  GBHx:  ectopic x2 s/p RS and LS IVF -->  CS TIUP GynHx: age of menarche 11, monthly, lasts 2 wks pap reportedly all normal denies STI hx PMH: breast cancer, depression, anxiety PSH: left breast lumpectomy , ectopic x2 s/p RS and LS, myomectomy in [facility] 8 years ago Social: denies T/E/D lives w son as primary taker, son is autistic daughter just started college at [employer]  in [place], came to [place] 14 y ago by herself w her children Allergies: pantoprazole - burning stomach and itching hands and feet   Pelvic Sono (): FINDINGS: Uterus: 11.1 cm x 7.7 cm x 7.5 cm. 5.0 cm x 3.8 cm x 4.2 cm fibroid.  Heterogeneous parenchyma likely due to additional underlying fibroids.  Myometrial cyst measuring 0.5 cm x 0.3 cm x 0.5 cm. Endometrium: Heterogeneously thickened measuring up to 19 mm which could  be due to blood products versus phase of the patient's menstrual cycle.  Right ovary: 3.3 cm x 2.1 cm x 2.2 cm. 3.0 cm x 1.8 cm x 1.9 cm cyst.  Normal arterial waveforms. Left ovary: Not visualized. No adnexal mass.  Fluid: Small amount of adnexal and posterior cul-de-sac free fluid.  IMPRESSION: 1. Fibroid uterus. 2. Small right ovarian cyst. [Mammogramdate] : summer 2024 [PapSmeardate] : 2024 [ColonoscopyDate] : never [TextBox_43] : was scheduled but then cancelled due to anemia

## 2025-01-28 NOTE — CONSULT LETTER
[Dear  ___] : Dear  [unfilled], [Consult Letter:] : I had the pleasure of evaluating your patient, [unfilled]. [Please see my note below.] : Please see my note below. [Consult Closing:] : Thank you very much for allowing me to participate in the care of this patient.  If you have any questions, please do not hesitate to contact me. [Sincerely,] : Sincerely, [FreeTextEntry3] : Lorene Limon MD

## 2025-01-28 NOTE — PHYSICAL EXAM
[Chaperone Present] : A chaperone was present in the examining room during all aspects of the physical examination [83364] : A chaperone was present during the pelvic exam. [Appropriately responsive] : appropriately responsive [Alert] : alert [No Acute Distress] : no acute distress [Soft] : soft [Non-tender] : non-tender [Non-distended] : non-distended [No HSM] : No HSM [No Lesions] : no lesions [No Mass] : no mass [Oriented x3] : oriented x3 [No Discharge] : no discharge [The Right Breast Was Examined] : a normal appearance [The Left Breast Was Examined] : a normal appearance [___] : a [unfilled] ~Ucm mastectomy scar [No Masses] : no breast masses were palpable [Labia Majora] : normal [Labia Minora] : normal [No Bleeding] : There was no active vaginal bleeding [Normal] : normal [Enlarged ___ wks] : enlarged [unfilled] ~Uweeks [Uterine Adnexae] : normal [FreeTextEntry2] : MURPHY Brownlee [FreeTextEntry6] : fibroid uterus; feels mobile

## 2025-01-28 NOTE — PLAN
[FreeTextEntry1] : 48yo with AUB likely 2/2 to fibroids. Hx of breast cancer s/p tamoxifen use x2 years, discontinued last year We discussed that AUB very likely due to fibroids, however need to rule out malignancy dustin in setting of tamoxifen use. Discussed mgmt incl medical options such as GnRH antagonists/agonists and progesterone IUD. Discussed that hysterectomy is definitive mgmt as other methods there is a chance of failure. #HCM - pap/HPV obtained today, will f/u results - continue HCM per PCP #AUB - will book D&C, patient to get medical clearance from PCP - record release from [facility] for breast ca, would like to know what type she had - rx tranexamic acid for acute bleeding - agree w heme and iron infusions - bleeding precautions, if heavy bleeding again present to ED - will call pt once booking complete

## 2025-01-28 NOTE — HISTORY OF PRESENT ILLNESS
[Patient reported mammogram was normal] : Patient reported mammogram was normal [Patient reported PAP Smear was normal] : Patient reported PAP Smear was normal [FreeTextEntry1] : 50yo referred by PCP Dr. Arambula for AUB. Hx of breast cancer stage 0, s/p left lumpectomy in  s/p RT, was started on tamoxifen. States has had heavy periods for the past few years, but after starting tamoxifen periods became lighter. Periods were more painful/crampier on tamoxifen, so about a year ago stopped taking. [facility] oncologist told her can discontinue if does not like side effects. Since discontinuing tamoxifen, periods have returned to being heavy.  Has a hx of fibroids, s/p myomectomy for 8cm fibroid 7 years ago in [facility]. When the heavy bleeding resumed a year ago, went to another GYN in [place], had a D&C under anesthesia, was told pathology benign and fibroids weren't that large so should not be an issue.  Periods continue to be heavy, has not been on any medications due to history of cancer, was told no estrogen/progesterone. Sent to ED over the weekend for anemia, h/h was 5.7/22.7. Received 2u pRBC w improvement in h/h to . Also had pelvic sono in ED which showed fibroid uterus and thickened endometrial lining.  LMP was 25; gets periods [id number], bleeds for 2 wks during period.  Lately has had loss of appetite, fatigue, heartburn and stomach issues. States cannot take NSAIDs due to stomach sensitivities. Going to see heme for Fe infusions.  GBHx:  ectopic x2 s/p RS and LS IVF -->  CS TIUP GynHx: age of menarche 11, monthly, lasts 2 wks pap reportedly all normal denies STI hx PMH: breast cancer, depression, anxiety PSH: left breast lumpectomy , ectopic x2 s/p RS and LS, myomectomy in [facility] 8 years ago Social: denies T/E/D lives w son as primary taker, son is autistic daughter just started college at [employer]  in [place], came to [place] 14 [place] ago by herself w her children Allergies: pantoprazole - burning stomach and itching hands and feet   Pelvic Sono (): FINDINGS: Uterus: 11.1 cm x 7.7 cm x 7.5 cm. 5.0 cm x 3.8 cm x 4.2 cm fibroid.  Heterogeneous parenchyma likely due to additional underlying fibroids.  Myometrial cyst measuring 0.5 cm x 0.3 cm x 0.5 cm. Endometrium: Heterogeneously thickened measuring up to 19 mm which could  be due to blood products versus phase of the patient's menstrual cycle.  Right ovary: 3.3 cm x 2.1 cm x 2.2 cm. 3.0 cm x 1.8 cm x 1.9 cm cyst.  Normal arterial waveforms. Left ovary: Not visualized. No adnexal mass.  Fluid: Small amount of adnexal and posterior cul-de-sac free fluid.  IMPRESSION: 1. Fibroid uterus. 2. Small right ovarian cyst. [Mammogramdate] : summer 2024 [PapSmeardate] : 2024 [ColonoscopyDate] : never [TextBox_43] : was scheduled but then cancelled due to anemia

## 2025-01-29 LAB — HPV HIGH+LOW RISK DNA PNL CVX: NOT DETECTED

## 2025-01-30 LAB — CYTOLOGY CVX/VAG DOC THIN PREP: NORMAL

## 2025-02-11 ENCOUNTER — OUTPATIENT (OUTPATIENT)
Dept: OUTPATIENT SERVICES | Facility: HOSPITAL | Age: 50
LOS: 1 days | End: 2025-02-11
Payer: MEDICAID

## 2025-02-11 VITALS
WEIGHT: 117.95 LBS | HEIGHT: 65 IN | DIASTOLIC BLOOD PRESSURE: 71 MMHG | HEART RATE: 76 BPM | SYSTOLIC BLOOD PRESSURE: 102 MMHG | TEMPERATURE: 98 F | OXYGEN SATURATION: 99 % | RESPIRATION RATE: 16 BRPM

## 2025-02-11 DIAGNOSIS — Z98.891 HISTORY OF UTERINE SCAR FROM PREVIOUS SURGERY: Chronic | ICD-10-CM

## 2025-02-11 DIAGNOSIS — N93.9 ABNORMAL UTERINE AND VAGINAL BLEEDING, UNSPECIFIED: ICD-10-CM

## 2025-02-11 DIAGNOSIS — Z98.890 OTHER SPECIFIED POSTPROCEDURAL STATES: Chronic | ICD-10-CM

## 2025-02-11 DIAGNOSIS — Z92.29 PERSONAL HISTORY OF OTHER DRUG THERAPY: ICD-10-CM

## 2025-02-11 DIAGNOSIS — Z01.818 ENCOUNTER FOR OTHER PREPROCEDURAL EXAMINATION: ICD-10-CM

## 2025-02-11 LAB
ANION GAP SERPL CALC-SCNC: 4 MMOL/L — LOW (ref 5–17)
APTT BLD: 30.8 SEC — SIGNIFICANT CHANGE UP (ref 24.5–35.6)
BLD GP AB SCN SERPL QL: SIGNIFICANT CHANGE UP
BUN SERPL-MCNC: 15 MG/DL — SIGNIFICANT CHANGE UP (ref 7–18)
CALCIUM SERPL-MCNC: 8.5 MG/DL — SIGNIFICANT CHANGE UP (ref 8.4–10.5)
CHLORIDE SERPL-SCNC: 108 MMOL/L — SIGNIFICANT CHANGE UP (ref 96–108)
CO2 SERPL-SCNC: 28 MMOL/L — SIGNIFICANT CHANGE UP (ref 22–31)
CREAT SERPL-MCNC: 0.59 MG/DL — SIGNIFICANT CHANGE UP (ref 0.5–1.3)
EGFR: 110 ML/MIN/1.73M2 — SIGNIFICANT CHANGE UP
GLUCOSE SERPL-MCNC: 121 MG/DL — HIGH (ref 70–99)
HCT VFR BLD CALC: 32.4 % — LOW (ref 34.5–45)
HGB BLD-MCNC: 9.5 G/DL — LOW (ref 11.5–15.5)
INR BLD: 0.89 RATIO — SIGNIFICANT CHANGE UP (ref 0.85–1.16)
MCHC RBC-ENTMCNC: 22.9 PG — LOW (ref 27–34)
MCHC RBC-ENTMCNC: 29.3 G/DL — LOW (ref 32–36)
MCV RBC AUTO: 78.1 FL — LOW (ref 80–100)
NRBC BLD AUTO-RTO: 0 /100 WBCS — SIGNIFICANT CHANGE UP (ref 0–0)
PLATELET # BLD AUTO: 377 K/UL — SIGNIFICANT CHANGE UP (ref 150–400)
POTASSIUM SERPL-MCNC: 4.1 MMOL/L — SIGNIFICANT CHANGE UP (ref 3.5–5.3)
POTASSIUM SERPL-SCNC: 4.1 MMOL/L — SIGNIFICANT CHANGE UP (ref 3.5–5.3)
PROTHROM AB SERPL-ACNC: 10.3 SEC — SIGNIFICANT CHANGE UP (ref 9.9–13.4)
RBC # BLD: 4.15 M/UL — SIGNIFICANT CHANGE UP (ref 3.8–5.2)
RBC # FLD: 29.4 % — HIGH (ref 10.3–14.5)
SODIUM SERPL-SCNC: 140 MMOL/L — SIGNIFICANT CHANGE UP (ref 135–145)
WBC # BLD: 7.29 K/UL — SIGNIFICANT CHANGE UP (ref 3.8–10.5)
WBC # FLD AUTO: 7.29 K/UL — SIGNIFICANT CHANGE UP (ref 3.8–10.5)

## 2025-02-11 NOTE — H&P PST ADULT - PROBLEM SELECTOR PLAN 2
Scheduled for dilation and curettage with hysteroscopy on 2/18/2025  Continue iron infusions as scheduled

## 2025-02-11 NOTE — H&P PST ADULT - ATTENDING COMMENTS
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 Here today for D&C w hysteroscopy.  Discussed procedure and the risks/benefits/alternatives. Pt verbalized understanding, all questions answered to pt apparent satisfaction.  Informed consents obtained and signed, placed in chart.  Will have close outpatient follow-up.  Zulma Molina MD

## 2025-02-11 NOTE — H&P PST ADULT - NSANTHGENDERRD_ENT_A_CORE
Patient instructed on Plain Rodrick Protocol Stress Test Procedure including possible side effects. Verbalizes knowledge and understanding and denies having any questions.
No

## 2025-02-11 NOTE — H&P PST ADULT - ASSESSMENT
48 y/o multiparous female with PMH of  anxiety disorder, depression (lexapro, ambien), GERD (famotidine),  left breast cancer ( s/p lumpectomy, followed by RT and tomoxifen) is diagnosed with abnormal uterine and vaginal bleeding, unspecified, personal history of other drug therapy. She is scheduled for dilation and curettage with hysteroscopy on 2025    STOP BANG SCORE IS 1  CAPRINI SCORE  3    AGE RELATED RISK FACTORS                                                             [x ] Age 41-60 years                                            (1 Point)  [ ] Age: 61-74 years                                           (2 Points)                 [ ] Age= 75 years                                                (3 Points)             DISEASE RELATED RISK FACTORS                                                       [ ] Edema in the lower extremities                 (1 Point)                     [ ] Varicose veins                                               (1 Point)                                 [ ] BMI > 25 Kg/m2                                            (1 Point)                                  [ ] Serious infection (ie PNA)                            (1 Point)                     [ ] Lung disease ( COPD, Emphysema)            (1 Point)                                                                          [ ] Acute myocardial infarction                         (1 Point)                  [ ] Congestive heart failure (in the previous month)  (1 Point)         [ ] Inflammatory bowel disease                            (1 Point)                  [ ] Central venous access, PICC or Port               (2 points)       (within the last month)                                                                [ ] Stroke (in the previous month)                        (5 Points)    [ ] Previous or present malignancy                       (2 points)                                                                                                                                                         HEMATOLOGY RELATED FACTORS                                                         [ ] Prior episodes of VTE                                     (3 Points)                     [ ] Positive family history for VTE                      (3 Points)                  [ ] Prothrombin 69683 A                                     (3 Points)                     [ ] Factor V Leiden                                                (3 Points)                        [ ] Lupus anticoagulants                                      (3 Points)                                                           [ ] Anticardiolipin antibodies                              (3 Points)                                                       [ ] High homocysteine in the blood                   (3 Points)                                             [ ] Other congenital or acquired thrombophilia      (3 Points)                                                [ ] Heparin induced thrombocytopenia                  (3 Points)                                        MOBILITY RELATED FACTORS  [ ] Bed rest                                                         (1 Point)  [ ] Plaster cast                                                    (2 points)  [ ] Bed bound for more than 72 hours           (2 Points)    GENDER SPECIFIC FACTORS  [ ] Pregnancy or had a baby within the last month   (1 Point)  [ ] Post-partum < 6 weeks                                   (1 Point)  [ ] Hormonal therapy  or oral contraception   (1 Point)  [ ] History of pregnancy complications              (1 point)  [ ] Unexplained or recurrent              (1 Point)    OTHER RISK FACTORS                                           (1 Point)  [x ] BMI >40, smoking, diabetes requiring insulin, chemotherapy  blood transfusions and length of surgery over 2 hours    SURGERY RELATED RISK FACTORS  [ ]  Section within the last month     (1 Point)  [x ] Minor surgery                                                  (1 Point)  [ ] Arthroscopic surgery                                       (2 Points)  [ ] Planned major surgery lasting more            (2 Points)      than 45 minutes     [ ] Elective hip or knee joint replacement       (5 points)       surgery                                                TRAUMA RELATED RISK FACTORS  [ ] Fracture of the hip, pelvis, or leg                       (5 Points)  [ ] Spinal cord injury resulting in paralysis             (5 points)       (in the previous month)    [ ] Paralysis  (less than 1 month)                             (5 Points)  [ ] Multiple Trauma within 1 month                        (5 Points)    Total Score [   3    ]    Caprini Score 0-2: Low Risk, mechanical prophylaxis (ie:compression devices)  Caprini Score 3-6: Moderate Risk , pharmacologic VTE prophylaxis is indicated for most patients (in the absence of contraindications)  Caprini Score Greater than or =7: High risk, pharmocologic VTE prophylaxis indicated for most patients (in the absence of contraindications)

## 2025-02-11 NOTE — H&P PST ADULT - PROBLEM SELECTOR PLAN 1
Scheduled for dilation and curettage with hysteroscopy on 2/18/2025  Preoperative instructions discussed and given to patient.   Patient agrees to follow up with surgeon's office for instructions prior to surgery   Discussed preprocedure skin preparation using  chlorhexidine gluconate 4% solution three days prior to  surgery - including the day of surgery  Instructed patient to avoid aspirin and aspirin products, over the counter medications such as vitamins and herbal medications, one week prior to surgery.  Take Tylenol as needed for pain  Follow up with PCP postoperatively for management of chronic conditions  Patient verbalized understanding of instructions  labs here today, ekg/mc pending from PCP

## 2025-02-11 NOTE — H&P PST ADULT - HISTORY OF PRESENT ILLNESS
48 y/o female with PMH of  anxiety disorder, depression (lexapro, ambien), GERD (famotidine),  left breast cancer (2020 s/p lumpectomy, followed by RT and tomoxifen) is diagnosed with abnormal uterine and vaginal bleeding, unspecified, personal history of other drug therapy. She is scheduled for dilation and curettage with hysteroscopy on 2/18/2025

## 2025-02-11 NOTE — H&P PST ADULT - GENITOURINARY COMMENTS
heavy, prolonged bleeding with menses ongoing for the past year, severe abdominal cramps with periods preop diagnosis abnormal uterine and vaginal bleeding

## 2025-02-12 PROCEDURE — 80048 BASIC METABOLIC PNL TOTAL CA: CPT

## 2025-02-12 PROCEDURE — G0463: CPT

## 2025-02-12 PROCEDURE — 85730 THROMBOPLASTIN TIME PARTIAL: CPT

## 2025-02-12 PROCEDURE — 86900 BLOOD TYPING SEROLOGIC ABO: CPT

## 2025-02-12 PROCEDURE — 85027 COMPLETE CBC AUTOMATED: CPT

## 2025-02-12 PROCEDURE — 86901 BLOOD TYPING SEROLOGIC RH(D): CPT

## 2025-02-12 PROCEDURE — 85610 PROTHROMBIN TIME: CPT

## 2025-02-12 PROCEDURE — 36415 COLL VENOUS BLD VENIPUNCTURE: CPT

## 2025-02-12 PROCEDURE — 86850 RBC ANTIBODY SCREEN: CPT

## 2025-02-18 ENCOUNTER — TRANSCRIPTION ENCOUNTER (OUTPATIENT)
Age: 50
End: 2025-02-18

## 2025-02-18 ENCOUNTER — OUTPATIENT (OUTPATIENT)
Dept: OUTPATIENT SERVICES | Facility: HOSPITAL | Age: 50
LOS: 1 days | End: 2025-02-18
Payer: MEDICAID

## 2025-02-18 ENCOUNTER — RESULT REVIEW (OUTPATIENT)
Age: 50
End: 2025-02-18

## 2025-02-18 VITALS
SYSTOLIC BLOOD PRESSURE: 116 MMHG | HEART RATE: 73 BPM | TEMPERATURE: 98 F | RESPIRATION RATE: 16 BRPM | HEIGHT: 65 IN | OXYGEN SATURATION: 95 % | WEIGHT: 117.95 LBS | DIASTOLIC BLOOD PRESSURE: 73 MMHG

## 2025-02-18 VITALS
RESPIRATION RATE: 16 BRPM | SYSTOLIC BLOOD PRESSURE: 119 MMHG | OXYGEN SATURATION: 100 % | DIASTOLIC BLOOD PRESSURE: 70 MMHG | HEART RATE: 82 BPM

## 2025-02-18 DIAGNOSIS — N93.9 ABNORMAL UTERINE AND VAGINAL BLEEDING, UNSPECIFIED: ICD-10-CM

## 2025-02-18 DIAGNOSIS — Z01.818 ENCOUNTER FOR OTHER PREPROCEDURAL EXAMINATION: ICD-10-CM

## 2025-02-18 DIAGNOSIS — Z98.891 HISTORY OF UTERINE SCAR FROM PREVIOUS SURGERY: Chronic | ICD-10-CM

## 2025-02-18 DIAGNOSIS — Z92.29 PERSONAL HISTORY OF OTHER DRUG THERAPY: ICD-10-CM

## 2025-02-18 DIAGNOSIS — Z98.890 OTHER SPECIFIED POSTPROCEDURAL STATES: Chronic | ICD-10-CM

## 2025-02-18 LAB
BLD GP AB SCN SERPL QL: SIGNIFICANT CHANGE UP
HCG UR QL: NEGATIVE — SIGNIFICANT CHANGE UP

## 2025-02-18 PROCEDURE — 88305 TISSUE EXAM BY PATHOLOGIST: CPT | Mod: 26

## 2025-02-18 PROCEDURE — 81025 URINE PREGNANCY TEST: CPT

## 2025-02-18 PROCEDURE — 58558 HYSTEROSCOPY BIOPSY: CPT

## 2025-02-18 PROCEDURE — 86850 RBC ANTIBODY SCREEN: CPT

## 2025-02-18 PROCEDURE — 88305 TISSUE EXAM BY PATHOLOGIST: CPT

## 2025-02-18 PROCEDURE — 86901 BLOOD TYPING SEROLOGIC RH(D): CPT

## 2025-02-18 PROCEDURE — 36415 COLL VENOUS BLD VENIPUNCTURE: CPT

## 2025-02-18 PROCEDURE — 86900 BLOOD TYPING SEROLOGIC ABO: CPT

## 2025-02-18 RX ORDER — FENTANYL CITRATE-0.9 % NACL/PF 100MCG/2ML
25 SYRINGE (ML) INTRAVENOUS
Refills: 0 | Status: DISCONTINUED | OUTPATIENT
Start: 2025-02-18 | End: 2025-02-18

## 2025-02-18 RX ORDER — ONDANSETRON HCL/PF 4 MG/2 ML
4 VIAL (ML) INJECTION ONCE
Refills: 0 | Status: DISCONTINUED | OUTPATIENT
Start: 2025-02-18 | End: 2025-02-18

## 2025-02-18 RX ORDER — IBUPROFEN 200 MG
1 TABLET ORAL
Qty: 0 | Refills: 0 | DISCHARGE

## 2025-02-18 RX ORDER — ALPRAZOLAM 0.5 MG
1 TABLET, EXTENDED RELEASE 24 HR ORAL
Refills: 0 | DISCHARGE

## 2025-02-18 RX ORDER — HYDROMORPHONE/SOD CHLOR,ISO/PF 2 MG/10 ML
0.5 SYRINGE (ML) INJECTION
Refills: 0 | Status: DISCONTINUED | OUTPATIENT
Start: 2025-02-18 | End: 2025-02-18

## 2025-02-18 NOTE — BRIEF OPERATIVE NOTE - NSICDXBRIEFPOSTOP_GEN_ALL_CORE_FT
POST-OP DIAGNOSIS:  Abnormal uterine bleeding (AUB) 18-Feb-2025 18:29:46  Zulma Molina  History of tamoxifen therapy 18-Feb-2025 18:29:55  Zulma Molina

## 2025-02-18 NOTE — BRIEF OPERATIVE NOTE - NSICDXBRIEFPREOP_GEN_ALL_CORE_FT
PRE-OP DIAGNOSIS:  Abnormal uterine bleeding 18-Feb-2025 18:29:13  Zulma Molina  History of tamoxifen therapy 18-Feb-2025 18:29:27  Zulma Molina

## 2025-02-18 NOTE — ASU DISCHARGE PLAN (ADULT/PEDIATRIC) - PLEASE INDICATE TEMPERATURE IN FAHRENHEIT OR CELSIUS
100.4
My signature below certifies that the above stated patient is homebound and upon completion of the Face-To-Face encounter, has the need for intermittent skilled nursing, physical therapy and/or speech or occupational therapy services in their home for their current diagnosis as outlined in their initial plan of care. These services will continue to be monitored by myself or another physician.

## 2025-02-18 NOTE — BRIEF OPERATIVE NOTE - OPERATION/FINDINGS
anteverted 10cm uterus  uterus sounded to 10cm  polypoid endometrial lining noted on hysteroscopy  bleeding at left tenaculum site noted, hemostasis achieved with pressure and silver nitrate

## 2025-02-18 NOTE — ASU DISCHARGE PLAN (ADULT/PEDIATRIC) - CARE PROVIDER_API CALL
Zulma Molina  Obstetrics and Gynecology  [address] [place] [address], [address]  [place] [address]  Phone: [phone]  Fax: [phone]  Follow Up Time: 2 weeks

## 2025-02-18 NOTE — ASU DISCHARGE PLAN (ADULT/PEDIATRIC) - FINANCIAL ASSISTANCE
[facility] provides services at a reduced cost to those who are determined to be eligible through [facility]’s financial assistance program. Information regarding [facility]’s financial assistance program can be found by going to [email or web address] or by calling [phone].

## 2025-02-19 ENCOUNTER — NON-APPOINTMENT (OUTPATIENT)
Age: 50
End: 2025-02-19

## 2025-02-21 LAB — SURGICAL PATHOLOGY STUDY: SIGNIFICANT CHANGE UP

## 2025-03-13 ENCOUNTER — APPOINTMENT (OUTPATIENT)
Dept: OBGYN | Facility: CLINIC | Age: 50
End: 2025-03-13
Payer: MEDICAID

## 2025-03-13 VITALS
HEART RATE: 76 BPM | DIASTOLIC BLOOD PRESSURE: 77 MMHG | WEIGHT: 116 LBS | OXYGEN SATURATION: 96 % | RESPIRATION RATE: 16 BRPM | SYSTOLIC BLOOD PRESSURE: 108 MMHG | BODY MASS INDEX: 19.3 KG/M2 | TEMPERATURE: 97.6 F

## 2025-03-13 DIAGNOSIS — N85.00 ENDOMETRIAL HYPERPLASIA, UNSPECIFIED: ICD-10-CM

## 2025-03-13 PROCEDURE — 99213 OFFICE O/P EST LOW 20 MIN: CPT

## 2025-03-13 RX ORDER — LEVONORGESTREL 52 MG/1
20 INTRAUTERINE DEVICE INTRAUTERINE
Qty: 1 | Refills: 0 | Status: ACTIVE | COMMUNITY
Start: 2025-03-13 | End: 1900-01-01

## 2025-03-13 RX ORDER — TRANEXAMIC ACID 650 MG/1
650 TABLET ORAL EVERY 8 HOURS
Qty: 30 | Refills: 6 | Status: ACTIVE | COMMUNITY
Start: 2025-03-13 | End: 1900-01-01

## 2025-03-13 NOTE — HISTORY OF PRESENT ILLNESS
[Pain is well-controlled] : pain is well-controlled [Fever] : no fever [Chills] : no chills [Nausea] : no nausea [Vomiting] : no vomiting [Diarrhea] : no diarrhea [Vaginal Bleeding] : vaginal bleeding [Pelvic Pressure] : no pelvic pressure [Dysuria] : no dysuria [Vaginal Discharge] : no vaginal discharge [Constipation] : no constipation [Healed] : healed [Mild] : mild vaginal bleeding [Normal] : normal [Pathology reviewed] : pathology reviewed [de-identified] : 50yo w AUB menorrhagia requiring blood transfusion, fibroids and hx of tamoxifen use S/p D&C hysteroscopy on 2/18/25 Path - endometrial hyperplasia without atypia, fragments of endometrial polyps  LMP started again 3/10/25, states first few days were not bad but then day 3 (yesterday) had heavy bleeding, soaking through multiple pads. Went to see Dr. Calderon hematologist yesterday, hgb was 12.  Previous was prescribed TXA, which helped w the cycle prior to surgery - had normal period and not heavy. Would like to try again. [de-identified] : aprox 15cc of blood removed from vaginal vault; minimal bleeding noted from cervical os on valsalva

## 2025-03-13 NOTE — REASON FOR VISIT
[Post Op Day: ___] : Post-Op Day:  #[unfilled] [Procedure: ___] : Procedure: [unfilled] [Indication: ___] : Indication: [unfilled] [Friend] : friend [de-identified] : D&C hysteroscopy on 2/18/25

## 2025-03-13 NOTE — PLAN
[FreeTextEntry1] : Discussed pathology, and options for endometrial hyperplasia without atypia. Discussed risks of progression to endometrial cancer. Pt had initially expressed to me desire for surgery, but is currently trying to sell her house and so would like to defer for 6 months. Amenable to medical therapy. Discussed options for med mgmt incl Mirena IUD and oral progesterone; would recommend IUD Mirena given hx of ER/DE+ DCIS.  Discussed Mirena, poss AE/SE incl uterine perforation, IUD migration or explusion.  Will rx Mirena to pharmacy, pt instructed to bring to [place]. Will schedule for IUD insertion on Monday. Rx TXA sent.

## 2025-04-01 ENCOUNTER — APPOINTMENT (OUTPATIENT)
Dept: OBGYN | Facility: CLINIC | Age: 50
End: 2025-04-01

## 2025-04-14 ENCOUNTER — APPOINTMENT (OUTPATIENT)
Dept: OBGYN | Facility: CLINIC | Age: 50
End: 2025-04-14
Payer: MEDICAID

## 2025-04-14 ENCOUNTER — ASOB RESULT (OUTPATIENT)
Age: 50
End: 2025-04-14

## 2025-04-14 VITALS
TEMPERATURE: 97.6 F | OXYGEN SATURATION: 97 % | BODY MASS INDEX: 19.97 KG/M2 | HEART RATE: 71 BPM | RESPIRATION RATE: 16 BRPM | WEIGHT: 120 LBS | DIASTOLIC BLOOD PRESSURE: 74 MMHG | SYSTOLIC BLOOD PRESSURE: 111 MMHG

## 2025-04-14 DIAGNOSIS — Z86.000 PERSONAL HISTORY OF IN-SITU NEOPLASM OF BREAST: ICD-10-CM

## 2025-04-14 PROCEDURE — 58300 INSERT INTRAUTERINE DEVICE: CPT

## 2025-04-14 PROCEDURE — 76830 TRANSVAGINAL US NON-OB: CPT

## 2025-04-14 PROCEDURE — 76856 US EXAM PELVIC COMPLETE: CPT | Mod: 59

## 2025-04-15 NOTE — PLAN
[FreeTextEntry1] : 50yo patient w AUB. Fibroids, adenomyosis, hx of tamoxifen use 2/2. w endometrial hyperplasia without atypia. We discussed options including my recommendation for hysterectomy, pt does not want to undergo hysterectomy at this time. Will proceed with Mirena IUD. Patient counseled that IUD does contain progesterone, although much less systemically available than w oral progesterone and the associated risk of progesterone exposure given DCIS was NE+. Given patient w heavy bleeding necessitating blood transfusions, we discussed that the benefits of treatment for heavy bleeding likely outweighs risks of minimal progesterone exposure, patient understands and accepts these risks. Patient counseled that she must follow-up with her oncologist, is due for a mammogram - referral was given today. Follow-up in 3 wks for IUD position check. Follow-up in 2 months for bleeding/contraception check.

## 2025-04-15 NOTE — PLAN
[FreeTextEntry1] : 50yo patient w AUB. Fibroids, adenomyosis, hx of tamoxifen use 2/2. w endometrial hyperplasia without atypia. We discussed options including my recommendation for hysterectomy, pt does not want to undergo hysterectomy at this time. Will proceed with Mirena IUD. Patient counseled that IUD does contain progesterone, although much less systemically available than w oral progesterone and the associated risk of progesterone exposure given DCIS was OR+. Given patient w heavy bleeding necessitating blood transfusions, we discussed that the benefits of treatment for heavy bleeding likely outweighs risks of minimal progesterone exposure, patient understands and accepts these risks. Patient counseled that she must follow-up with her oncologist, is due for a mammogram - referral was given today. Follow-up in 3 wks for IUD position check. Follow-up in 2 months for bleeding/contraception check.

## 2025-04-15 NOTE — ED PROVIDER NOTE - CROS ED CONS ALL NEG
Spray Paint Technique: No Medical Necessity Clause: This procedure was medically necessary because the lesions that were treated were: Show Spray Paint Technique Variable?: Yes Spray Paint Text: The liquid nitrogen was applied to the skin utilizing a spray paint frosting technique. Detail Level: Detailed Consent: The patient's consent was obtained including but not limited to risks of crusting, scabbing, blistering, scarring, darker or lighter pigmentary change, recurrence, incomplete removal and infection. Post-Care Instructions: I reviewed with the patient in detail post-care instructions. Patient is to wear sunprotection, and avoid picking at any of the treated lesions. Pt may apply Vaseline to crusted or scabbing areas. Medical Necessity Information: It is in your best interest to select a reason for this procedure from the list below. All of these items fulfill various CMS LCD requirements except the new and changing color options. negative...

## 2025-04-15 NOTE — PROCEDURE
[IUD Placement] : intrauterine device (IUD) placement [Time out performed] : Pre-procedure time out performed.  Patient's name, date of birth and procedure confirmed. [Consent Obtained] : Consent obtained [Abnormal Uterine Bleeding] : abnormal uterine bleeding [Risks] : risks [Benefits] : benefits [Alternatives] : alternatives [Patient] : patient [Infection] : infection [Bleeding] : bleeding [Pain] : pain [Expulsion] : expulsion [Failure] : failure [Uterine Perforation] : uterine perforation [Neg Pregnancy Test] : negative pregnancy test [Neg GC/Chlamydia] : negative GC/Chlamydia [No Premedication] : No premedication [Betadine] : Betadine [Tenaculum] : Tenaculum [Easy Passage] : Easy passage [Sounded to ___ cm] : sounded to [unfilled] ~Ucm [Post Placement Transvag. US] : post placement transvaginal ultrasound [Mirena IUD] : Mirena IUD [US Guidance] : US Guidance [History of Unprotected [place]] : no history of unprotected intercourse [Tolerated Well] : Patient tolerated the procedure well [No Complications] : No complications [None] : None [LMPDate] : 3/8/25 [de-identified] : strings cut to 3-4cm [de-identified] : TU04A [id number] [de-identified] : 05/2027 [de-identified] : 05/2032

## 2025-04-15 NOTE — PROCEDURE
[IUD Placement] : intrauterine device (IUD) placement [Time out performed] : Pre-procedure time out performed.  Patient's name, date of birth and procedure confirmed. [Consent Obtained] : Consent obtained [Abnormal Uterine Bleeding] : abnormal uterine bleeding [Risks] : risks [Benefits] : benefits [Alternatives] : alternatives [Patient] : patient [Infection] : infection [Bleeding] : bleeding [Pain] : pain [Expulsion] : expulsion [Failure] : failure [Uterine Perforation] : uterine perforation [Neg Pregnancy Test] : negative pregnancy test [Neg GC/Chlamydia] : negative GC/Chlamydia [No Premedication] : No premedication [Betadine] : Betadine [Tenaculum] : Tenaculum [Easy Passage] : Easy passage [Sounded to ___ cm] : sounded to [unfilled] ~Ucm [Post Placement Transvag. US] : post placement transvaginal ultrasound [Mirena IUD] : Mirena IUD [US Guidance] : US Guidance [History of Unprotected [place]] : no history of unprotected intercourse [Tolerated Well] : Patient tolerated the procedure well [No Complications] : No complications [None] : None [LMPDate] : 3/8/25 [de-identified] : strings cut to 3-4cm [de-identified] : TU04A [id number] [de-identified] : 05/2027 [de-identified] : 05/2032

## 2025-04-15 NOTE — PLAN
[FreeTextEntry1] : 50yo patient w AUB. Fibroids, adenomyosis, hx of tamoxifen use 2/2. w endometrial hyperplasia without atypia. We discussed options including my recommendation for hysterectomy, pt does not want to undergo hysterectomy at this time. Will proceed with Mirena IUD. Patient counseled that IUD does contain progesterone, although much less systemically available than w oral progesterone and the associated risk of progesterone exposure given DCIS was MS+. Given patient w heavy bleeding necessitating blood transfusions, we discussed that the benefits of treatment for heavy bleeding likely outweighs risks of minimal progesterone exposure, patient understands and accepts these risks. Patient counseled that she must follow-up with her oncologist, is due for a mammogram - referral was given today. Follow-up in 3 wks for IUD position check. Follow-up in 2 months for bleeding/contraception check.

## 2025-07-14 NOTE — PROCEDURE
[Endometrial Biopsy] : Endometrial biopsy [Time out performed] : Pre-procedure time out performed.  Patient's name, date of birth and procedure confirmed. [Consent Obtained] : Consent obtained [F/U Hyperplasia] : follow-up for endometrial hyperplasia [Risks] : risks [Benefits] : benefits [Alternatives] : alternatives [Patient] : patient [Infection] : infection [Bleeding] : bleeding [Uterine Perforation] : uterine perforation [Pain] : pain [Negative] : negative pregnancy test [No Premedication] : No premedication [Betadine] : Betadine [Tenaculum] : Tenaculum [Easy Passage] : Easy passage [Sounded to ___ cm] : sounded to [unfilled] ~Ucm [Anteverted] : anteverted [Moderate] : moderate [Specimen Collected] : collected [Sent to Pathology] : placed in buffered formalin and sent for pathology [US Guided] : Ultrasound was used to guide the endometrial biopsy [Tolerated Well] : Patient tolerated the procedure well [No Complications] : No complications [de-identified] : tenaculum, uterine sound, endometrial biopsy pipelle [de-identified] : tenaculum was placed on posterior lip of cervix due to anteflexed uterus; bleeding noted at right tenaculum site, applied silver nitrate with excellent hemostasis achieved

## 2025-07-14 NOTE — PLAN
[FreeTextEntry1] : 50yo w hx of endometrial hyperplasia w/o atypia. Mirena IUD was placed in April. Here for 3 month f/u of endometrial hyperplasia. Since Mirena IUD, heavy menorrhagia lessened to persistent vaginal bleeding daily. Bleeding eventually became just spotting until last week, stopped bleeding altogether. Discussed w pt that if progression of hyperplasia, recommend surgery. Discussed that given hx of DCIS, she must be getting routine mammograms given IUD still has low level systemic progesterone - will schedule soon; states always gets sonograms w her mammogram due to dense breast, will give ref today for sono.  Will call w results. RTO in 3 months for f/u embx.

## (undated) DEVICE — DRAPE TOWEL BLUE 17" X 24"

## (undated) DEVICE — SOL IRR SORBITOL 3% 3000ML

## (undated) DEVICE — GOWN LG

## (undated) DEVICE — TUBING SET TUR BLADDER IRRIGATION Y-TYPE 81"

## (undated) DEVICE — DRAPE LEGGINGS 6" CUFF 28X43"

## (undated) DEVICE — WARMING BLANKET UPPER ADULT

## (undated) DEVICE — DRAPE 1/2 SHEET 40X57"

## (undated) DEVICE — VENODYNE/SCD SLEEVE CALF MEDIUM

## (undated) DEVICE — TUBING TUR 2 PRONG

## (undated) DEVICE — GLV 7.5 PROTEXIS (WHITE)

## (undated) DEVICE — PREP BETADINE KIT

## (undated) DEVICE — LAP PAD W RING 18 X 18"

## (undated) DEVICE — SOL IRR POUR H2O 500ML

## (undated) DEVICE — PACK PERI GYN

## (undated) DEVICE — SOL IRR POUR NS 0.9% 1500ML

## (undated) DEVICE — DRAPE LIGHT HANDLE COVER (BLUE)

## (undated) DEVICE — URETERAL CATH RED RUBBER 16FR (ORANGE)

## (undated) DEVICE — ELCTR CUTTING LOOP MONOPOLAR ANGLED 24F

## (undated) DEVICE — SOL INJ NS 0.9% 500ML 1-PORT

## (undated) DEVICE — TUBING STRYKER HYSTEROSCOPY INFLOW OUTFLOW